# Patient Record
Sex: FEMALE | Race: WHITE | Employment: OTHER | URBAN - METROPOLITAN AREA
[De-identification: names, ages, dates, MRNs, and addresses within clinical notes are randomized per-mention and may not be internally consistent; named-entity substitution may affect disease eponyms.]

---

## 2017-10-06 ENCOUNTER — HOSPITAL ENCOUNTER (OUTPATIENT)
Dept: MAMMOGRAPHY | Age: 62
Discharge: HOME | End: 2017-10-06
Attending: NURSE PRACTITIONER
Payer: MEDICARE

## 2017-10-06 DIAGNOSIS — Z12.31 VISIT FOR SCREENING MAMMOGRAM: ICD-10-CM

## 2017-10-06 PROCEDURE — 77067 SCR MAMMO BI INCL CAD: CPT

## 2017-10-20 ENCOUNTER — HOSPITAL ENCOUNTER (OUTPATIENT)
Dept: MAMMOGRAPHY | Age: 62
Discharge: HOME | End: 2017-10-20
Attending: NURSE PRACTITIONER
Payer: MEDICARE

## 2017-10-20 ENCOUNTER — HOSPITAL ENCOUNTER (OUTPATIENT)
Dept: ULTRASOUND IMAGING | Age: 62
Discharge: HOME | End: 2017-10-20
Payer: MEDICARE

## 2017-10-20 DIAGNOSIS — R92.8 ABNORMAL MAMMOGRAM OF RIGHT BREAST: ICD-10-CM

## 2017-10-20 PROCEDURE — 76642 ULTRASOUND BREAST LIMITED: CPT

## 2017-10-20 PROCEDURE — 77065 DX MAMMO INCL CAD UNI: CPT

## 2017-10-25 ENCOUNTER — HOSPITAL ENCOUNTER (OUTPATIENT)
Dept: CT IMAGING | Age: 62
Discharge: HOME | End: 2017-10-25
Attending: UROLOGY
Payer: MEDICARE

## 2017-10-25 DIAGNOSIS — R31.29 MICROHEMATURIA: ICD-10-CM

## 2017-10-25 PROCEDURE — 74178 CT ABD&PLV WO CNTR FLWD CNTR: CPT

## 2017-10-25 PROCEDURE — 74011636320 HC RX REV CODE- 636/320: Performed by: UROLOGY

## 2017-10-25 PROCEDURE — 74011250636 HC RX REV CODE- 250/636: Performed by: UROLOGY

## 2017-10-25 RX ORDER — SODIUM CHLORIDE 9 MG/ML
50 INJECTION, SOLUTION INTRAVENOUS
Status: COMPLETED | OUTPATIENT
Start: 2017-10-25 | End: 2017-10-25

## 2017-10-25 RX ORDER — SODIUM CHLORIDE 0.9 % (FLUSH) 0.9 %
10 SYRINGE (ML) INJECTION
Status: COMPLETED | OUTPATIENT
Start: 2017-10-25 | End: 2017-10-25

## 2017-10-25 RX ADMIN — Medication 10 ML: at 06:57

## 2017-10-25 RX ADMIN — SODIUM CHLORIDE 50 ML/HR: 900 INJECTION, SOLUTION INTRAVENOUS at 06:57

## 2017-10-25 RX ADMIN — IOPAMIDOL 100 ML: 612 INJECTION, SOLUTION INTRAVENOUS at 06:57

## 2017-12-22 RX ORDER — LISINOPRIL 5 MG/1
5 TABLET ORAL
COMMUNITY

## 2017-12-22 RX ORDER — FOLIC ACID 1 MG/1
1 TABLET ORAL DAILY
COMMUNITY
End: 2018-02-09

## 2017-12-22 RX ORDER — GABAPENTIN 300 MG/1
900 CAPSULE ORAL
Status: ON HOLD | COMMUNITY
End: 2022-10-25

## 2017-12-22 RX ORDER — ACETAMINOPHEN 500 MG
500 TABLET ORAL 3 TIMES DAILY
COMMUNITY

## 2017-12-22 RX ORDER — TRAMADOL HYDROCHLORIDE 50 MG/1
50 TABLET ORAL 3 TIMES DAILY
COMMUNITY
End: 2019-01-20

## 2017-12-22 RX ORDER — GABAPENTIN 300 MG/1
300 CAPSULE ORAL DAILY
COMMUNITY

## 2017-12-22 RX ORDER — LANOLIN ALCOHOL/MO/W.PET/CERES
1000 CREAM (GRAM) TOPICAL DAILY
COMMUNITY

## 2017-12-22 RX ORDER — METHOTREXATE 2.5 MG/1
TABLET ORAL
COMMUNITY
End: 2018-02-09

## 2017-12-22 RX ORDER — OMEPRAZOLE 20 MG/1
40 CAPSULE, DELAYED RELEASE ORAL DAILY
COMMUNITY

## 2017-12-22 RX ORDER — GLUCOSAMINE SULFATE 1500 MG
2000 POWDER IN PACKET (EA) ORAL DAILY
COMMUNITY

## 2017-12-25 ENCOUNTER — ANESTHESIA EVENT (OUTPATIENT)
Dept: ENDOSCOPY | Age: 62
End: 2017-12-25
Payer: MEDICARE

## 2017-12-26 ENCOUNTER — ANESTHESIA (OUTPATIENT)
Dept: ENDOSCOPY | Age: 62
End: 2017-12-26
Payer: MEDICARE

## 2017-12-26 ENCOUNTER — HOSPITAL ENCOUNTER (OUTPATIENT)
Age: 62
Setting detail: OUTPATIENT SURGERY
Discharge: HOME | End: 2017-12-26
Attending: SPECIALIST | Admitting: SPECIALIST
Payer: MEDICARE

## 2017-12-26 VITALS
TEMPERATURE: 98.4 F | OXYGEN SATURATION: 98 % | WEIGHT: 221.6 LBS | SYSTOLIC BLOOD PRESSURE: 137 MMHG | RESPIRATION RATE: 18 BRPM | HEART RATE: 72 BPM | HEIGHT: 67 IN | BODY MASS INDEX: 34.78 KG/M2 | DIASTOLIC BLOOD PRESSURE: 38 MMHG

## 2017-12-26 PROCEDURE — 74011250636 HC RX REV CODE- 250/636

## 2017-12-26 PROCEDURE — 88305 TISSUE EXAM BY PATHOLOGIST: CPT | Performed by: SPECIALIST

## 2017-12-26 PROCEDURE — 77030027957 HC TBNG IRR ENDOGTR BUSS -B: Performed by: SPECIALIST

## 2017-12-26 PROCEDURE — 76040000019: Performed by: SPECIALIST

## 2017-12-26 PROCEDURE — 77030013992 HC SNR POLYP ENDOSC BSC -B: Performed by: SPECIALIST

## 2017-12-26 PROCEDURE — 77030011640 HC PAD GRND REM COVD -A: Performed by: SPECIALIST

## 2017-12-26 PROCEDURE — 76060000031 HC ANESTHESIA FIRST 0.5 HR: Performed by: SPECIALIST

## 2017-12-26 PROCEDURE — 74011000250 HC RX REV CODE- 250

## 2017-12-26 RX ORDER — SODIUM CHLORIDE 0.9 % (FLUSH) 0.9 %
5-10 SYRINGE (ML) INJECTION EVERY 8 HOURS
Status: DISCONTINUED | OUTPATIENT
Start: 2017-12-26 | End: 2017-12-26 | Stop reason: HOSPADM

## 2017-12-26 RX ORDER — EPINEPHRINE 0.1 MG/ML
1 INJECTION INTRACARDIAC; INTRAVENOUS
Status: DISCONTINUED | OUTPATIENT
Start: 2017-12-26 | End: 2017-12-26 | Stop reason: HOSPADM

## 2017-12-26 RX ORDER — PROPOFOL 10 MG/ML
INJECTION, EMULSION INTRAVENOUS AS NEEDED
Status: DISCONTINUED | OUTPATIENT
Start: 2017-12-26 | End: 2017-12-26 | Stop reason: HOSPADM

## 2017-12-26 RX ORDER — SODIUM CHLORIDE 0.9 % (FLUSH) 0.9 %
5-10 SYRINGE (ML) INJECTION AS NEEDED
Status: DISCONTINUED | OUTPATIENT
Start: 2017-12-26 | End: 2017-12-26 | Stop reason: HOSPADM

## 2017-12-26 RX ORDER — LIDOCAINE HYDROCHLORIDE 20 MG/ML
INJECTION, SOLUTION EPIDURAL; INFILTRATION; INTRACAUDAL; PERINEURAL AS NEEDED
Status: DISCONTINUED | OUTPATIENT
Start: 2017-12-26 | End: 2017-12-26 | Stop reason: HOSPADM

## 2017-12-26 RX ORDER — ATROPINE SULFATE 0.1 MG/ML
0.5 INJECTION INTRAVENOUS
Status: DISCONTINUED | OUTPATIENT
Start: 2017-12-26 | End: 2017-12-26 | Stop reason: HOSPADM

## 2017-12-26 RX ORDER — SODIUM CHLORIDE 9 MG/ML
INJECTION, SOLUTION INTRAVENOUS
Status: DISCONTINUED | OUTPATIENT
Start: 2017-12-26 | End: 2017-12-26 | Stop reason: HOSPADM

## 2017-12-26 RX ORDER — FENTANYL CITRATE 50 UG/ML
200 INJECTION, SOLUTION INTRAMUSCULAR; INTRAVENOUS
Status: DISCONTINUED | OUTPATIENT
Start: 2017-12-26 | End: 2017-12-26 | Stop reason: HOSPADM

## 2017-12-26 RX ORDER — DEXTROMETHORPHAN/PSEUDOEPHED 2.5-7.5/.8
1.2 DROPS ORAL
Status: DISCONTINUED | OUTPATIENT
Start: 2017-12-26 | End: 2017-12-26 | Stop reason: HOSPADM

## 2017-12-26 RX ORDER — MIDAZOLAM HYDROCHLORIDE 1 MG/ML
.25-1 INJECTION, SOLUTION INTRAMUSCULAR; INTRAVENOUS
Status: DISCONTINUED | OUTPATIENT
Start: 2017-12-26 | End: 2017-12-26 | Stop reason: HOSPADM

## 2017-12-26 RX ORDER — NALOXONE HYDROCHLORIDE 0.4 MG/ML
0.4 INJECTION, SOLUTION INTRAMUSCULAR; INTRAVENOUS; SUBCUTANEOUS
Status: DISCONTINUED | OUTPATIENT
Start: 2017-12-26 | End: 2017-12-26 | Stop reason: HOSPADM

## 2017-12-26 RX ORDER — FLUMAZENIL 0.1 MG/ML
0.2 INJECTION INTRAVENOUS
Status: DISCONTINUED | OUTPATIENT
Start: 2017-12-26 | End: 2017-12-26 | Stop reason: HOSPADM

## 2017-12-26 RX ORDER — SODIUM CHLORIDE 9 MG/ML
50 INJECTION, SOLUTION INTRAVENOUS CONTINUOUS
Status: DISCONTINUED | OUTPATIENT
Start: 2017-12-26 | End: 2017-12-26 | Stop reason: HOSPADM

## 2017-12-26 RX ADMIN — PROPOFOL 40 MG: 10 INJECTION, EMULSION INTRAVENOUS at 08:45

## 2017-12-26 RX ADMIN — PROPOFOL 40 MG: 10 INJECTION, EMULSION INTRAVENOUS at 08:58

## 2017-12-26 RX ADMIN — SODIUM CHLORIDE: 9 INJECTION, SOLUTION INTRAVENOUS at 08:32

## 2017-12-26 RX ADMIN — PROPOFOL 40 MG: 10 INJECTION, EMULSION INTRAVENOUS at 08:47

## 2017-12-26 RX ADMIN — PROPOFOL 40 MG: 10 INJECTION, EMULSION INTRAVENOUS at 09:02

## 2017-12-26 RX ADMIN — LIDOCAINE HYDROCHLORIDE 20 MG: 20 INJECTION, SOLUTION EPIDURAL; INFILTRATION; INTRACAUDAL; PERINEURAL at 08:45

## 2017-12-26 RX ADMIN — PROPOFOL 40 MG: 10 INJECTION, EMULSION INTRAVENOUS at 08:51

## 2017-12-26 RX ADMIN — PROPOFOL 40 MG: 10 INJECTION, EMULSION INTRAVENOUS at 09:04

## 2017-12-26 RX ADMIN — PROPOFOL 40 MG: 10 INJECTION, EMULSION INTRAVENOUS at 08:49

## 2017-12-26 RX ADMIN — PROPOFOL 30 MG: 10 INJECTION, EMULSION INTRAVENOUS at 09:06

## 2017-12-26 RX ADMIN — PROPOFOL 40 MG: 10 INJECTION, EMULSION INTRAVENOUS at 08:55

## 2017-12-26 RX ADMIN — PROPOFOL 40 MG: 10 INJECTION, EMULSION INTRAVENOUS at 09:00

## 2017-12-26 NOTE — IP AVS SNAPSHOT
[address] 
[phone] Patient: Edd Cotto MRN: [id number] About your hospitalization You were admitted on:  December 26, 2017 You last received care in the:  [address] [place] [address] You were discharged on:  December 26, 2017 Why you were hospitalized Your primary diagnosis was:  Not on File Discharge Orders None A check bethanie indicates which time of day the medication should be taken. My Medications TAKE these medications as instructed Instructions Each Dose to Equal  
 Morning Noon Evening Bedtime  
 acetaminophen 500 mg tablet Commonly known as:  TYLENOL Your last dose was: Your next dose is: Take 500 mg by mouth three (3) times daily. 500 mg  
    
   
   
   
  
 escitalopram oxalate 20 mg tablet Commonly known as:  Lius Clement Your last dose was: Your next dose is: Take 20 mg by mouth daily. 20 mg  
    
   
   
   
  
 folic acid 1 mg tablet Commonly known as:  Google Your last dose was: Your next dose is: Take 1 mg by mouth daily. 1 mg  
    
   
   
   
  
 * gabapentin 300 mg capsule Commonly known as:  NEURONTIN Your last dose was: Your next dose is: Take 300 mg by mouth daily. 300 mg  
    
   
   
   
  
 * gabapentin 300 mg capsule Commonly known as:  NEURONTIN Your last dose was: Your next dose is: Take 600 mg by mouth nightly. 600 mg  
    
   
   
   
  
 lisinopril 5 mg tablet Commonly known as:  Telma Temple Your last dose was: Your next dose is: Take 5 mg by mouth daily. 5 mg  
    
   
   
   
  
 methotrexate 2.5 mg tablet Commonly known as:  Kay Dhillon Your last dose was: Your next dose is: Take  by mouth. Takes 6 tablets po every Sunday. omeprazole 20 mg capsule Commonly known as:  PRILOSEC Your last dose was: Your next dose is: Take 40 mg by mouth daily. 40 mg  
    
   
   
   
  
 traMADol 50 mg tablet Commonly known as:  ULTRAM  
   
Your last dose was: Your next dose is: Take 50 mg by mouth three (3) times daily. 50 mg  
    
   
   
   
  
 VITAMIN B-12 1,000 mcg tablet Generic drug:  cyanocobalamin Your last dose was: Your next dose is: Take 1,000 mcg by mouth daily. 1000 mcg VITAMIN D3 1,000 unit Cap Generic drug:  cholecalciferol Your last dose was: Your next dose is: Take 2,000 Units by mouth daily. 2000 Units * Notice: This list has 2 medication(s) that are the same as other medications prescribed for you. Read the directions carefully, and ask your doctor or other care provider to review them with you. Discharge Instructions Roxy Ly [id number] COLON DISCHARGE INSTRUCTIONS Discomfort: 
Redness at IV site- apply warm compress to area; if redness or soreness persist- contact your physician There may be a slight amount of blood passed from the rectum Gaseous discomfort- walking, belching will help relieve any discomfort You may not operate a vehicle for 12 hours You may not engage in an occupation involving machinery or appliances for rest of today You may not drink alcoholic beverages for at least 12 hours Avoid making any critical decisions for at least 24 hour DIET: 
 High fiber diet.  however -  remember your colon is empty and a heavy meal will produce gas. Avoid these foods:  vegetables, fried / greasy foods, carbonated drinks for today. MEDICATIONS: 
  
 Regarding Aspirin or Nonsteroidal medications, please see below.  
 
ACTIVITY: 
 You may resume your normal daily activities it is recommended that you spend the remainder of the day resting -  avoid any strenuous activity. CALL M.D. ANY SIGN OF: Increasing pain, nausea, vomiting Abdominal distension (swelling) New increased bleeding (oral or rectal) Fever (chills) Pain in chest area Bloody discharge from nose or mouth Shortness of breath You may not  take any Advil, Aspirin, Ibuprofen, Motrin, Aleve, or Goodys for 10 days, ONLY  Tylenol as needed for pain. Follow-up Instructions: 
 Call Dr. Madeline Gomez Results of procedure / biopsy in 10 days Telephone #  [phone] DISCHARGE SUMMARY from Nurse The following personal items collected during your admission are returned to you:  
Dental Appliance: Dental Appliances: None Vision: Visual Aid: None Hearing Aid:   
Jewelry:   
Clothing:   
Other Valuables:   
Valuables sent to safe:   
 
 
 
 
  
  
  
Introducing [facility]! Dear Dada Hui: Thank you for requesting a Att[employer] account. Our records indicate that you already have an active Att[employer] Live account. You can access your account anytime at [email or web address] Did you know that you can access your hospital and ER discharge instructions at any time in Att[employer] Live? You can also review all of your test results from your hospital stay or ER visit. Additional Information If you have questions, please visit the Frequently Asked Questions section of the Att[employer] Live website at [email or web address] Remember, Att[employer] Live is NOT to be used for urgent needs. For medical emergencies, dial 911. Now available from your iPhone and Android! Providers Seen During Your Hospitalization Provider Specialty Primary office phone Fernando Benitez MD Gastroenterology [phone] Your Primary Care Physician (PCP) Primary Care Physician Office Phone Office Fax Bryan Escobar [phone] You are allergic to the following Allergen Reactions Morphine Nausea and Vomiting  
    
 Sulfa (Sulfonamide Antibiotics) Hives Recent Documentation Height Weight Breastfeeding? BMI OB Status Smoking Status 1.702 m 100.5 kg No 34.71 kg/m2 Hysterectomy Current Every Day Smoker Emergency Contacts Name Discharge Info Relation Home Work Mobile Natacha Alvarez DISCHARGE CAREGIVER [3] Spouse [3] [phone] Patient Belongings The following personal items are in your possession at time of discharge: 
  Dental Appliances: None  Visual Aid: None Please provide this summary of care documentation to your next provider. Signatures-by signing, you are acknowledging that this After Visit Summary has been reviewed with you and you have received a copy. Patient Signature:  ____________________________________________________________ Date:  ____________________________________________________________  
  
Vanesa Sleight Provider Signature:  ____________________________________________________________ Date:  ____________________________________________________________

## 2017-12-26 NOTE — DISCHARGE INSTRUCTIONS
Lee Ann Last  [id number]  1955    COLON DISCHARGE INSTRUCTIONS  Discomfort:  Redness at IV site- apply warm compress to area; if redness or soreness persist- contact your physician  There may be a slight amount of blood passed from the rectum  Gaseous discomfort- walking, belching will help relieve any discomfort  You may not operate a vehicle for 12 hours  You may not engage in an occupation involving machinery or appliances for rest of today  You may not drink alcoholic beverages for at least 12 hours  Avoid making any critical decisions for at least 24 hour  DIET:   High fiber diet. - however -  remember your colon is empty and a heavy meal will produce gas. Avoid these foods:  vegetables, fried / greasy foods, carbonated drinks for today. MEDICATIONS:      Regarding Aspirin or Nonsteroidal medications, please see below. ACTIVITY:  You may resume your normal daily activities it is recommended that you spend the remainder of the day resting -  avoid any strenuous activity. CALL M.D. ANY SIGN OF:  Increasing pain, nausea, vomiting  Abdominal distension (swelling)  New increased bleeding (oral or rectal)  Fever (chills)  Pain in chest area  Bloody discharge from nose or mouth  Shortness of breath    You may not  take any Advil, Aspirin, Ibuprofen, Motrin, Aleve, or Goodys for 10 days, ONLY  Tylenol as needed for pain.       Follow-up Instructions:   Call Dr. Echo Coy  Results of procedure / biopsy in 10 days  Telephone #  [phone]      DISCHARGE SUMMARY from Nurse    The following personal items collected during your admission are returned to you:   Dental Appliance: Dental Appliances: None  Vision: Visual Aid: None  Hearing Aid:    Jewelry:    Clothing:    Other Valuables:    Valuables sent to safe:

## 2017-12-26 NOTE — PROGRESS NOTES

## 2017-12-26 NOTE — ANESTHESIA PREPROCEDURE EVALUATION
Anesthetic History   No history of anesthetic complications            Review of Systems / Medical History  Patient summary reviewed, nursing notes reviewed and pertinent labs reviewed    Pulmonary  Within defined limits                 Neuro/Psych   Within defined limits           Cardiovascular    Hypertension              Exercise tolerance: >4 METS     GI/Hepatic/Renal     GERD           Endo/Other        Obesity     Other Findings              Physical Exam    Airway  Mallampati: II  TM Distance: 4 - 6 cm  Neck ROM: normal range of motion   Mouth opening: Normal     Cardiovascular  Regular rate and rhythm,  S1 and S2 normal,  no murmur, click, rub, or gallop             Dental  No notable dental hx       Pulmonary  Breath sounds clear to auscultation               Abdominal  GI exam deferred       Other Findings            Anesthetic Plan    ASA: 2  Anesthesia type: MAC          Induction: Intravenous  Anesthetic plan and risks discussed with: Patient

## 2017-12-26 NOTE — H&P
Colonoscopy History and Physical      The patient was seen and examined. Date of last colonoscopy: 2010, Polyps  Yes      The airway was assessed and documented. The problem list, past medical history, and medications were reviewed. Patient Active Problem List   Diagnosis Code    Spinal stenosis M48.00     Social History     Social History    Marital status:      Spouse name: N/A    Number of children: N/A    Years of education: N/A     Occupational History    Not on file. Social History Main Topics    Smoking status: Current Every Day Smoker     Packs/day: 0.50    Smokeless tobacco: Never Used    Alcohol use Yes      Comment: rare    Drug use: No    Sexual activity: Not on file     Other Topics Concern    Not on file     Social History Narrative     Past Medical History:   Diagnosis Date    Adverse effect of anesthesia     passed out when \"she woke me up\"    Anxiety     Arthritis     Calculus of kidney     Chronic pain     arthritis/bursitis both hips/back    Depression     GERD (gastroesophageal reflux disease)     Hypertension     Other ill-defined conditions(799.89)     kidney stones         Prior to Admission Medications   Prescriptions Last Dose Informant Patient Reported? Taking?   acetaminophen (TYLENOL) 500 mg tablet 12/25/2017 at Unknown time  Yes Yes   Sig: Take 500 mg by mouth three (3) times daily. cholecalciferol (VITAMIN D3) 1,000 unit cap 12/25/2017 at Unknown time  Yes Yes   Sig: Take 2,000 Units by mouth daily. cyanocobalamin (VITAMIN B-12) 1,000 mcg tablet 12/25/2017 at Unknown time  Yes Yes   Sig: Take 1,000 mcg by mouth daily. escitalopram oxalate (LEXAPRO) 20 mg tablet 12/25/2017 at Unknown time  Yes Yes   Sig: Take 20 mg by mouth daily. folic acid (FOLVITE) 1 mg tablet 12/25/2017 at Unknown time  Yes Yes   Sig: Take 1 mg by mouth daily. gabapentin (NEURONTIN) 300 mg capsule 12/25/2017 at Unknown time  Yes Yes   Sig: Take 300 mg by mouth daily. gabapentin (NEURONTIN) 300 mg capsule 12/25/2017 at Unknown time  Yes Yes   Sig: Take 600 mg by mouth nightly. lisinopril (PRINIVIL, ZESTRIL) 5 mg tablet 12/25/2017 at Unknown time  Yes Yes   Sig: Take 5 mg by mouth daily. methotrexate (RHEUMATREX) 2.5 mg tablet 12/25/2017 at Unknown time  Yes Yes   Sig: Take  by mouth. Takes 6 tablets po every Sunday. omeprazole (PRILOSEC) 20 mg capsule 12/25/2017 at Unknown time  Yes Yes   Sig: Take 40 mg by mouth daily. traMADol (ULTRAM) 50 mg tablet 12/25/2017 at Unknown time  Yes Yes   Sig: Take 50 mg by mouth three (3) times daily. Facility-Administered Medications: None       The patient was seen and examined in the endoscopy suite. The airway was assessed and docuemented. The problem list and medications were reviewed. Chief complaint, history of present illness, and review of systems and Past medical History are positive for: depression, GERD and colon polyps    The heart, lungs and mental status were satisfactory for the administration of sedation and for the procedure. I discussed with the patient the objectives, risks, consequences and alternatives to the procedure.      Plan: Endoscopic procedure with sedation     Mariana Coates MD   12/26/2017  8:47 AM

## 2017-12-26 NOTE — ROUTINE PROCESS
Dmitriy Milton  1955  [id number]    Situation:  Verbal report received from: Prerna Clinton RN  Procedure: Procedure(s):  COLONOSCOPY  ENDOSCOPIC POLYPECTOMY    Background:    Preoperative diagnosis: History of polyps  Postoperative diagnosis: 1.- Colon Polyps    :  Dr. Lizzie Lucio  Assistant(s): Endoscopy Technician-1: Martha Gonzalez  Endoscopy RN-1: Mio Chun RN    Specimens:   ID Type Source Tests Collected by Time Destination   1 : Transverse Colon Polyps Preservative   Cheikh Orozco MD 12/26/2017 3389 Pathology     H. Pylori  no    Assessment:  Intra-procedure medications   Anesthesia gave intra-procedure sedation and medications, see anesthesia flow sheet yes    Intravenous fluids: NS@ KVO     Vital signs stable     Abdominal assessment: round and soft     Recommendation:  Discharge patient per MD order.   Family or Friend   Permission to share finding with family or friend yes

## 2017-12-26 NOTE — PROCEDURES
[facility]  [address]                 Colonoscopy Procedure Note    Indications:   See Preoperative Diagnosis above  Referring Physician: Breanna Rizvi NP  Anesthesia/Sedation: MAC anesthesia Propofol  Endoscopist:  Dr. Gary Bauman  Assistant:  Endoscopy Technician-1: Muna James  Endoscopy RN-1: Ab Padilla RN  Preoperative diagnosis: History of polyps  Postoperative diagnosis: 1.- Colon Polyps    Procedure in Detail:  Informed consent was obtained for the procedure, including sedation. Risks of perforation, hemorrhage, adverse drug reaction, and aspiration were discussed. The patient was placed in the left lateral decubitus position. Based on the pre-procedure assessment, including review of the patient's medical history, medications, allergies, and review of systems, she had been deemed to be an appropriate candidate for moderate sedation; she was therefore sedated with the medications listed above. The patient was monitored continuously with ECG tracing, pulse oximetry, blood pressure monitoring, and direct observations. A rectal examination was performed. The [id number] was inserted into the rectum and advanced under direct vision to the cecum, which was identified by the ileocecal valve and appendiceal orifice. The quality of the colonic preparation was good. A careful inspection was made as the colonoscope was withdrawn, including a retroflexed view of the rectum; findings and interventions are described below. Appropriate photodocumentation was obtained. Findings:  Rectum: normal  Sigmoid: normal  Descending Colon: normal  Transverse Colon: Two polyps measuring 5, 6 mm were removed with colon   Ascending Colon: normal  Cecum: normal      Specimens:    colon polyp    EBL: None    Complications: None; patient tolerated the procedure well. Recommendations:     - Await pathology. - Repeat colonoscopy in 5 years.     Signed By: Cidny Lopez MD                        December 26, 2017

## 2017-12-26 NOTE — ANESTHESIA POSTPROCEDURE EVALUATION
Post-Anesthesia Evaluation and Assessment    Patient: Brenda Vazquez MRN: [id number]  SSN: [id number]    YOB: 1955  Age: 65 y.o. Sex: female       Cardiovascular Function/Vital Signs  Visit Vitals    BP (!) 137/38    Pulse 72    Temp 36.9 °C (98.4 °F)    Resp 18    Ht 5' 7\" (1.702 m)    Wt 100.5 kg (221 lb 9.6 oz)    SpO2 98%    Breastfeeding No    BMI 34.71 kg/m2       Patient is status post MAC anesthesia for Procedure(s):  COLONOSCOPY  ENDOSCOPIC POLYPECTOMY. Nausea/Vomiting: None    Postoperative hydration reviewed and adequate. Pain:  Pain Scale 1: Numeric (0 - 10) (12/26/17 0940)  Pain Intensity 1: 0 (12/26/17 0940)   Managed    Neurological Status: At baseline    Mental Status and Level of Consciousness: Arousable    Pulmonary Status:   O2 Device: Room air (12/26/17 0940)   Adequate oxygenation and airway patent    Complications related to anesthesia: None    Post-anesthesia assessment completed.  No concerns    Signed By: Valentin Altman DO     December 26, 2017

## 2018-02-09 ENCOUNTER — APPOINTMENT (OUTPATIENT)
Dept: CT IMAGING | Age: 63
End: 2018-02-09
Attending: EMERGENCY MEDICINE
Payer: MEDICARE

## 2018-02-09 ENCOUNTER — HOSPITAL ENCOUNTER (EMERGENCY)
Age: 63
Discharge: HOME | End: 2018-02-09
Attending: EMERGENCY MEDICINE
Payer: MEDICARE

## 2018-02-09 VITALS
RESPIRATION RATE: 18 BRPM | HEIGHT: 67 IN | HEART RATE: 70 BPM | DIASTOLIC BLOOD PRESSURE: 64 MMHG | BODY MASS INDEX: 34.95 KG/M2 | SYSTOLIC BLOOD PRESSURE: 137 MMHG | TEMPERATURE: 97.7 F | WEIGHT: 222.66 LBS | OXYGEN SATURATION: 97 %

## 2018-02-09 DIAGNOSIS — R10.9 RIGHT SIDED ABDOMINAL PAIN: Primary | ICD-10-CM

## 2018-02-09 LAB
ALBUMIN SERPL-MCNC: 4 G/DL (ref 3.5–5)
ALBUMIN/GLOB SERPL: 1 {RATIO} (ref 1.1–2.2)
ALP SERPL-CCNC: 70 U/L (ref 45–117)
ALT SERPL-CCNC: 28 U/L (ref 12–78)
ANION GAP SERPL CALC-SCNC: 4 MMOL/L (ref 5–15)
APPEARANCE UR: ABNORMAL
AST SERPL-CCNC: 12 U/L (ref 15–37)
BILIRUB SERPL-MCNC: 0.3 MG/DL (ref 0.2–1)
BILIRUB UR QL CFM: NEGATIVE
BUN SERPL-MCNC: 11 MG/DL (ref 6–20)
BUN/CREAT SERPL: 12 (ref 12–20)
CALCIUM SERPL-MCNC: 9.1 MG/DL (ref 8.5–10.1)
CHLORIDE SERPL-SCNC: 103 MMOL/L (ref 97–108)
CO2 SERPL-SCNC: 30 MMOL/L (ref 21–32)
COLOR UR: ABNORMAL
CREAT SERPL-MCNC: 0.91 MG/DL (ref 0.55–1.02)
ERYTHROCYTE [DISTWIDTH] IN BLOOD BY AUTOMATED COUNT: 12.8 % (ref 11.5–14.5)
GLOBULIN SER CALC-MCNC: 3.9 G/DL (ref 2–4)
GLUCOSE SERPL-MCNC: 81 MG/DL (ref 65–100)
GLUCOSE UR STRIP.AUTO-MCNC: NEGATIVE MG/DL
HCG UR QL: NEGATIVE
HCT VFR BLD AUTO: 44.4 % (ref 35–47)
HGB BLD-MCNC: 14.9 G/DL (ref 11.5–16)
HGB UR QL STRIP: NEGATIVE
KETONES UR QL STRIP.AUTO: NEGATIVE MG/DL
LEUKOCYTE ESTERASE UR QL STRIP.AUTO: NEGATIVE
MCH RBC QN AUTO: 33.8 PG (ref 26–34)
MCHC RBC AUTO-ENTMCNC: 33.6 G/DL (ref 30–36.5)
MCV RBC AUTO: 100.7 FL (ref 80–99)
NITRITE UR QL STRIP.AUTO: NEGATIVE
NRBC # BLD: 0 K/UL (ref 0–0.01)
NRBC BLD-RTO: 0 PER 100 WBC
PH UR STRIP: 6 [PH] (ref 5–8)
PLATELET # BLD AUTO: 255 K/UL (ref 150–400)
PMV BLD AUTO: 9.4 FL (ref 8.9–12.9)
POTASSIUM SERPL-SCNC: 4.1 MMOL/L (ref 3.5–5.1)
PROT SERPL-MCNC: 7.9 G/DL (ref 6.4–8.2)
PROT UR STRIP-MCNC: NEGATIVE MG/DL
RBC # BLD AUTO: 4.41 M/UL (ref 3.8–5.2)
SODIUM SERPL-SCNC: 137 MMOL/L (ref 136–145)
SP GR UR REFRACTOMETRY: 1.03 (ref 1–1.03)
UROBILINOGEN UR QL STRIP.AUTO: 1 EU/DL (ref 0.2–1)
WBC # BLD AUTO: 11.7 K/UL (ref 3.6–11)

## 2018-02-09 PROCEDURE — 80053 COMPREHEN METABOLIC PANEL: CPT | Performed by: EMERGENCY MEDICINE

## 2018-02-09 PROCEDURE — 74177 CT ABD & PELVIS W/CONTRAST: CPT

## 2018-02-09 PROCEDURE — 74011636320 HC RX REV CODE- 636/320: Performed by: EMERGENCY MEDICINE

## 2018-02-09 PROCEDURE — 81003 URINALYSIS AUTO W/O SCOPE: CPT | Performed by: EMERGENCY MEDICINE

## 2018-02-09 PROCEDURE — 85027 COMPLETE CBC AUTOMATED: CPT | Performed by: EMERGENCY MEDICINE

## 2018-02-09 PROCEDURE — 36415 COLL VENOUS BLD VENIPUNCTURE: CPT | Performed by: EMERGENCY MEDICINE

## 2018-02-09 PROCEDURE — 81025 URINE PREGNANCY TEST: CPT | Performed by: EMERGENCY MEDICINE

## 2018-02-09 PROCEDURE — 74011250636 HC RX REV CODE- 250/636: Performed by: EMERGENCY MEDICINE

## 2018-02-09 PROCEDURE — 99283 EMERGENCY DEPT VISIT LOW MDM: CPT

## 2018-02-09 RX ORDER — DICYCLOMINE HYDROCHLORIDE 10 MG/1
10 CAPSULE ORAL 4 TIMES DAILY
Qty: 20 CAP | Refills: 0 | Status: SHIPPED | OUTPATIENT
Start: 2018-02-09 | End: 2018-02-14

## 2018-02-09 RX ORDER — SODIUM CHLORIDE 0.9 % (FLUSH) 0.9 %
10 SYRINGE (ML) INJECTION
Status: COMPLETED | OUTPATIENT
Start: 2018-02-09 | End: 2018-02-09

## 2018-02-09 RX ORDER — ONDANSETRON 4 MG/1
4 TABLET, FILM COATED ORAL
Qty: 20 TAB | Refills: 0 | Status: SHIPPED | OUTPATIENT
Start: 2018-02-09

## 2018-02-09 RX ORDER — SODIUM CHLORIDE 9 MG/ML
50 INJECTION, SOLUTION INTRAVENOUS
Status: COMPLETED | OUTPATIENT
Start: 2018-02-09 | End: 2018-02-09

## 2018-02-09 RX ADMIN — IOPAMIDOL 100 ML: 755 INJECTION, SOLUTION INTRAVENOUS at 14:31

## 2018-02-09 RX ADMIN — SODIUM CHLORIDE 50 ML/HR: 900 INJECTION, SOLUTION INTRAVENOUS at 14:31

## 2018-02-09 RX ADMIN — Medication 10 ML: at 14:31

## 2018-02-09 NOTE — DISCHARGE INSTRUCTIONS
Abdominal Pain: Care Instructions  Your Care Instructions    Abdominal pain has many possible causes. Some aren't serious and get better on their own in a few days. Others need more testing and treatment. If your pain continues or gets worse, you need to be rechecked and may need more tests to find out what is wrong. You may need surgery to correct the problem. Don't ignore new symptoms, such as fever, nausea and vomiting, urination problems, pain that gets worse, and dizziness. These may be signs of a more serious problem. Your doctor may have recommended a follow-up visit in the next 8 to 12 hours. If you are not getting better, you may need more tests or treatment. The doctor has checked you carefully, but problems can develop later. If you notice any problems or new symptoms, get medical treatment right away. Follow-up care is a key part of your treatment and safety. Be sure to make and go to all appointments, and call your doctor if you are having problems. It's also a good idea to know your test results and keep a list of the medicines you take. How can you care for yourself at home? · Rest until you feel better. · To prevent dehydration, drink plenty of fluids, enough so that your urine is light yellow or clear like water. Choose water and other caffeine-free clear liquids until you feel better. If you have kidney, heart, or liver disease and have to limit fluids, talk with your doctor before you increase the amount of fluids you drink. · If your stomach is upset, eat mild foods, such as rice, dry toast or crackers, bananas, and applesauce. Try eating several small meals instead of two or three large ones. · Wait until 48 hours after all symptoms have gone away before you have spicy foods, alcohol, and drinks that contain caffeine. · Do not eat foods that are high in fat. · Avoid anti-inflammatory medicines such as aspirin, ibuprofen (Advil, Motrin), and naproxen (Aleve).  These can cause stomach upset. Talk to your doctor if you take daily aspirin for another health problem. When should you call for help? Call 911 anytime you think you may need emergency care. For example, call if:  ? · You passed out (lost consciousness). ? · You pass maroon or very bloody stools. ? · You vomit blood or what looks like coffee grounds. ? · You have new, severe belly pain. ?Call your doctor now or seek immediate medical care if:  ? · Your pain gets worse, especially if it becomes focused in one area of your belly. ? · You have a new or higher fever. ? · Your stools are black and look like tar, or they have streaks of blood. ? · You have unexpected vaginal bleeding. ? · You have symptoms of a urinary tract infection. These may include:  ¨ Pain when you urinate. ¨ Urinating more often than usual.  ¨ Blood in your urine. ? · You are dizzy or lightheaded, or you feel like you may faint. ? Watch closely for changes in your health, and be sure to contact your doctor if:  ? · You are not getting better after 1 day (24 hours). Where can you learn more? Go to http://colin-hanna.info/. Enter W540 in the search box to learn more about \"Abdominal Pain: Care Instructions. \"  Current as of: March 20, 2017  Content Version: 11.4  © 8691-6973 [employer]. Care instructions adapted under license by [employer] (which disclaims liability or warranty for this information). If you have questions about a medical condition or this instruction, always ask your healthcare professional. [place] [address] any warranty or liability for your use of this information.

## 2018-02-09 NOTE — ED NOTES
Discharge instructions reviewed with patient, copy given by Dr. Jasper Poole. Pt is accomponied by family, denies use of wheelchair.

## 2018-02-10 NOTE — ED PROVIDER NOTES
EMERGENCY DEPARTMENT HISTORY AND PHYSICAL EXAM      Date: 2/9/2018  Patient Name: Joann Florez    History of Presenting Illness     Chief Complaint   Patient presents with    Abdominal Pain     right side for three days. History Provided By: Patient    HPI: Joann Florez, 65 y.o. female with PMHx significant for Arthritis, GERD, anxiety, depression, HTN, presents ambulatory to the ED with cc of acute onset, constant RLQ abdominal pain x 3-4 days with associated intermittent nausea. Pt was getting out of bed when her symptoms onset. Pt was recently seen by her PCP for her pain and received a \"pain shot\", she notes her current pain is a 5/10 after the shot. She also reports a recent colonoscopy, she states they found and treated some polyps but the colonoscopy was otherwise negative. Pt specifically denies any constipation, vomiting, dysuria, hematuria, urinary frequency, vaginal bleeding, vaginal discharge, fevers, excessive hunger. Social Hx: + Tobacco (0.5 ppd), + EtOH (rarely), - illicit drug use (-)    PCP: Kemi Lyon, NP    There are no other complaints, changes, or physical findings at this time. Current Outpatient Prescriptions   Medication Sig Dispense Refill    dicyclomine (BENTYL) 10 mg capsule Take 1 Cap by mouth four (4) times daily for 5 days. 20 Cap 0    ondansetron hcl (ZOFRAN, AS HYDROCHLORIDE,) 4 mg tablet Take 1 Tab by mouth every eight (8) hours as needed for Nausea. 20 Tab 0    cholecalciferol (VITAMIN D3) 1,000 unit cap Take 2,000 Units by mouth daily.  cyanocobalamin (VITAMIN B-12) 1,000 mcg tablet Take 1,000 mcg by mouth daily.  gabapentin (NEURONTIN) 300 mg capsule Take 300 mg by mouth daily.  gabapentin (NEURONTIN) 300 mg capsule Take 600 mg by mouth nightly.  traMADol (ULTRAM) 50 mg tablet Take 50 mg by mouth three (3) times daily.  lisinopril (PRINIVIL, ZESTRIL) 5 mg tablet Take 5 mg by mouth daily.       acetaminophen (TYLENOL) 500 mg tablet Take 500 mg by mouth three (3) times daily.  omeprazole (PRILOSEC) 20 mg capsule Take 40 mg by mouth daily.  escitalopram oxalate (LEXAPRO) 20 mg tablet Take 20 mg by mouth daily. Past History     Past Medical History:  Past Medical History:   Diagnosis Date    Adverse effect of anesthesia     passed out when \"she woke me up\"    Anxiety     Arthritis     Calculus of kidney     Chronic pain     arthritis/bursitis both hips/back    Depression     GERD (gastroesophageal reflux disease)     Hypertension     Other ill-defined conditions(799.89)     kidney stones       Past Surgical History:  Past Surgical History:   Procedure Laterality Date    CHEST SURGERY PROCEDURE UNLISTED      right lung lobectomy r/t fungal infection    COLONOSCOPY N/A 12/26/2017    COLONOSCOPY performed by Toshia Blake MD at [facility]    HX BREAST BIOPSY Left 2002    benign    HX CHOLECYSTECTOMY      HX GYN      partial hysterectomy - still have ovaries    HX ORTHOPAEDIC  4/28/12     L4-5 DECOMPRESSION AND FUSION with hardware    HX OTHER SURGICAL      fatty tumor removal (back)       Family History:  Family History   Problem Relation Age of Onset    Heart Disease Mother     Hypertension Brother     Diabetes Brother     Hypertension Brother     Diabetes Brother     Diabetes Brother     Cancer Brother      \"melanoma that grew inside\"    Cancer Maternal Aunt      lung    Cancer Maternal Uncle      ? where - 8 maternal uncles    Cancer Maternal Aunt      lung    Cancer Maternal Aunt      lung with mets       Social History:  Social History   Substance Use Topics    Smoking status: Current Every Day Smoker     Packs/day: 0.50    Smokeless tobacco: Never Used    Alcohol use Yes      Comment: rare       Allergies: Allergies   Allergen Reactions    Morphine Nausea and Vomiting    Sulfa (Sulfonamide Antibiotics) Hives         Review of Systems   Review of Systems   Constitutional: Negative. Negative for chills, diaphoresis and fever. HENT: Negative. Negative for congestion, sore throat and trouble swallowing. Eyes: Negative. Negative for photophobia, pain and redness. Respiratory: Negative. Negative for cough, chest tightness, shortness of breath and wheezing. Cardiovascular: Negative. Negative for chest pain and palpitations. Gastrointestinal: Positive for abdominal pain and nausea. Negative for blood in stool, diarrhea and vomiting. Genitourinary: Negative for difficulty urinating, dysuria and frequency. Musculoskeletal: Negative. Negative for arthralgias, myalgias, neck pain and neck stiffness. Skin: Negative. Neurological: Negative. Negative for dizziness, tremors, seizures, syncope, speech difficulty, light-headedness and headaches. Psychiatric/Behavioral: Negative. Negative for confusion. The patient is not nervous/anxious. All other systems reviewed and are negative. Physical Exam   Physical Exam   Constitutional: She is oriented to person, place, and time. She appears well-developed and well-nourished. HENT:   Head: Normocephalic and atraumatic. Eyes: Conjunctivae and EOM are normal.   Neck: Normal range of motion. Neck supple. Cardiovascular: Normal rate and regular rhythm. Pulmonary/Chest: Effort normal and breath sounds normal. No respiratory distress. Abdominal: Soft. She exhibits no distension. There is tenderness in the right lower quadrant.   + Rovsing's sign    Musculoskeletal: Normal range of motion. Neurological: She is alert and oriented to person, place, and time. Skin: Skin is warm and dry. Psychiatric: She has a normal mood and affect. Nursing note and vitals reviewed.         Diagnostic Study Results     Labs -     Recent Results (from the past 12 hour(s))   URINALYSIS W/ RFLX MICROSCOPIC    Collection Time: 02/09/18 12:56 PM   Result Value Ref Range    Color DARK YELLOW      Appearance CLOUDY (A) CLEAR      Specific gravity 1.028 1.003 - 1.030      pH (UA) 6.0 5.0 - 8.0      Protein NEGATIVE  NEG mg/dL    Glucose NEGATIVE  NEG mg/dL    Ketone NEGATIVE  NEG mg/dL    Blood NEGATIVE  NEG      Urobilinogen 1.0 0.2 - 1.0 EU/dL    Nitrites NEGATIVE  NEG      Leukocyte Esterase NEGATIVE  NEG     HCG URINE, QL    Collection Time: 02/09/18 12:56 PM   Result Value Ref Range    HCG urine, QL NEGATIVE  NEG     BILIRUBIN, CONFIRM    Collection Time: 02/09/18 12:56 PM   Result Value Ref Range    Bilirubin UA, confirm NEGATIVE  NEG     CBC W/O DIFF    Collection Time: 02/09/18  1:00 PM   Result Value Ref Range    WBC 11.7 (H) 3.6 - 11.0 K/uL    RBC 4.41 3.80 - 5.20 M/uL    HGB 14.9 11.5 - 16.0 g/dL    HCT 44.4 35.0 - 47.0 %    .7 (H) 80.0 - 99.0 FL    MCH 33.8 26.0 - 34.0 PG    MCHC 33.6 30.0 - 36.5 g/dL    RDW 12.8 11.5 - 14.5 %    PLATELET 468 940 - 655 K/uL    MPV 9.4 8.9 - 12.9 FL    NRBC 0.0 0  WBC    ABSOLUTE NRBC 0.00 0.00 - 1.31 K/uL   METABOLIC PANEL, COMPREHENSIVE    Collection Time: 02/09/18  1:00 PM   Result Value Ref Range    Sodium 137 136 - 145 mmol/L    Potassium 4.1 3.5 - 5.1 mmol/L    Chloride 103 97 - 108 mmol/L    CO2 30 21 - 32 mmol/L    Anion gap 4 (L) 5 - 15 mmol/L    Glucose 81 65 - 100 mg/dL    BUN 11 6 - 20 MG/DL    Creatinine 0.91 0.55 - 1.02 MG/DL    BUN/Creatinine ratio 12 12 - 20      GFR est AA >60 >60 ml/min/1.73m2    GFR est non-AA >60 >60 ml/min/1.73m2    Calcium 9.1 8.5 - 10.1 MG/DL    Bilirubin, total 0.3 0.2 - 1.0 MG/DL    ALT (SGPT) 28 12 - 78 U/L    AST (SGOT) 12 (L) 15 - 37 U/L    Alk. phosphatase 70 45 - 117 U/L    Protein, total 7.9 6.4 - 8.2 g/dL    Albumin 4.0 3.5 - 5.0 g/dL    Globulin 3.9 2.0 - 4.0 g/dL    A-G Ratio 1.0 (L) 1.1 - 2.2         Radiologic Studies -   CT ABD PELV W CONT   Final Result        CT Results  (Last 48 hours)               02/09/18 1430  CT ABD PELV W CONT Final result    Impression:  IMPRESSION:   Status post hysterectomy and cholecystectomy. No acute abnormality. Narrative:  EXAM:  CT ABD PELV W CONT       INDICATION: Right lower quadrant pain, fever, elevated white blood cell count   for 3 days       COMPARISON: 10/25/2017       CONTRAST:  100 mL of Isovue-370. TECHNIQUE:    Following the uneventful intravenous administration of contrast, thin axial   images were obtained through the abdomen and pelvis. Coronal and sagittal   reconstructions were generated. Oral contrast was not administered. CT dose   reduction was achieved through use of a standardized protocol tailored for this   examination and automatic exposure control for dose modulation. FINDINGS:    LUNG BASES: Clear. INCIDENTALLY IMAGED HEART AND MEDIASTINUM: Unremarkable. LIVER: No mass or biliary dilatation. GALLBLADDER: Surgically absent. SPLEEN: Splenic granulomata are noted. PANCREAS: No mass or ductal dilatation. ADRENALS: Unremarkable. KIDNEYS: Tiny simple and hemorrhagic cysts left kidney are stable. STOMACH: Unremarkable. SMALL BOWEL: No dilatation or wall thickening. COLON: No dilatation or wall thickening. APPENDIX: Unremarkable. PERITONEUM: No ascites or pneumoperitoneum. RETROPERITONEUM: No lymphadenopathy or aortic aneurysm. REPRODUCTIVE ORGANS: The uterus is surgically absent. URINARY BLADDER: No mass or calculus. BONES: Degenerative changes are seen in the hips bilaterally and lumbar spine. Postoperative changes are noted at L4-5. ADDITIONAL COMMENTS: N/A               CXR Results  (Last 48 hours)    None            Medical Decision Making   I am the first provider for this patient. I reviewed the vital signs, available nursing notes, past medical history, past surgical history, family history and social history. Vital Signs-Reviewed the patient's vital signs.   Patient Vitals for the past 12 hrs:   Temp Pulse Resp BP SpO2   02/09/18 1515 - 70 - 137/64 97 %   02/09/18 1415 - - - 157/70 95 %   02/09/18 1207 97.7 °F (36.5 °C) 66 18 (!) 129/95 98 %     Records Reviewed: Nursing Notes and Old Medical Records    Provider Notes (Medical Decision Making):   Patient presents with RLQ abdominal pain. DDx: kidney stone, Appendicitis, colitis, malrotation, ovarian pathology, hernia. Will obtain UA, UPT, labwork and analgesics and determine if CT abdomen warranted. ED Course:   Initial assessment performed. The patients presenting problems have been discussed, and they are in agreement with the care plan formulated and outlined with them. I have encouraged them to ask questions as they arise throughout their visit. Critical Care Time:   none    Disposition:  DISCHARGE NOTE  3:05 PM  The patient has been re-evaluated and is ready for discharge. Reviewed available results with patient. Counseled pt on diagnosis and care plan. Pt has expressed understanding, and all questions have been answered. Pt agrees with plan and agrees to F/U as recommended, or return to the ED if their sxs worsen. Discharge instructions have been provided and explained to the pt, along with reasons to return to the ED. PLAN:  1. Discharge Medication List as of 2/9/2018  3:05 PM      START taking these medications    Details   dicyclomine (BENTYL) 10 mg capsule Take 1 Cap by mouth four (4) times daily for 5 days. , Normal, Disp-20 Cap, R-0      ondansetron hcl (ZOFRAN, AS HYDROCHLORIDE,) 4 mg tablet Take 1 Tab by mouth every eight (8) hours as needed for Nausea., Normal, Disp-20 Tab, R-0         CONTINUE these medications which have NOT CHANGED    Details   cholecalciferol (VITAMIN D3) 1,000 unit cap Take 2,000 Units by mouth daily. , Historical Med      cyanocobalamin (VITAMIN B-12) 1,000 mcg tablet Take 1,000 mcg by mouth daily. , Historical Med      !! gabapentin (NEURONTIN) 300 mg capsule Take 300 mg by mouth daily. , Historical Med      !! gabapentin (NEURONTIN) 300 mg capsule Take 600 mg by mouth nightly., Historical Med      traMADol (ULTRAM) 50 mg tablet Take 50 mg by mouth three (3) times daily. , Historical Med      lisinopril (PRINIVIL, ZESTRIL) 5 mg tablet Take 5 mg by mouth daily. , Historical Med      acetaminophen (TYLENOL) 500 mg tablet Take 500 mg by mouth three (3) times daily. , Historical Med      omeprazole (PRILOSEC) 20 mg capsule Take 40 mg by mouth daily. , Historical Med      escitalopram oxalate (LEXAPRO) 20 mg tablet Take 20 mg by mouth daily. , Historical Med       !! - Potential duplicate medications found. Please discuss with provider. 2.   Follow-up Information     Follow up With Details Comments Contact Info    Vianca Victoria NP  If symptoms worsen [id number] [facility] [address]  [phone]          Return to ED if worse     Diagnosis     Clinical Impression:   1. Right sided abdominal pain        Attestations: This note is prepared by Mumtaz Lu acting as Scribe for MD Sha Rubin MD : The scribe's documentation has been prepared under my direction and personally reviewed by me in its entirety. I confirm that the note above accurately reflects all work, treatment, procedures, and medical decision making performed by me.

## 2018-04-15 ENCOUNTER — HOSPITAL ENCOUNTER (EMERGENCY)
Age: 63
Discharge: HOME | End: 2018-04-15
Attending: EMERGENCY MEDICINE
Payer: MEDICARE

## 2018-04-15 VITALS
SYSTOLIC BLOOD PRESSURE: 154 MMHG | HEART RATE: 87 BPM | DIASTOLIC BLOOD PRESSURE: 76 MMHG | HEIGHT: 67 IN | TEMPERATURE: 98.8 F | RESPIRATION RATE: 12 BRPM | BODY MASS INDEX: 34.95 KG/M2 | OXYGEN SATURATION: 94 % | WEIGHT: 222.66 LBS

## 2018-04-15 DIAGNOSIS — L02.91 ABSCESS: Primary | ICD-10-CM

## 2018-04-15 PROCEDURE — 75810000115

## 2018-04-15 PROCEDURE — 99282 EMERGENCY DEPT VISIT SF MDM: CPT

## 2018-04-15 PROCEDURE — 77030019895 HC PCKNG STRP IODO -A

## 2018-04-15 PROCEDURE — 77030018846 HC SOL IRR STRL H20 ICUM -A

## 2018-04-15 RX ORDER — DOXYCYCLINE 100 MG/1
100 TABLET ORAL 2 TIMES DAILY
Qty: 14 TAB | Refills: 0 | Status: SHIPPED | OUTPATIENT
Start: 2018-04-15 | End: 2018-04-22

## 2018-04-15 RX ORDER — LIDOCAINE HYDROCHLORIDE AND EPINEPHRINE 20; 5 MG/ML; UG/ML
INJECTION, SOLUTION EPIDURAL; INFILTRATION; INTRACAUDAL; PERINEURAL
Status: DISCONTINUED
Start: 2018-04-15 | End: 2018-04-15 | Stop reason: HOSPADM

## 2018-04-15 NOTE — ED PROVIDER NOTES
EMERGENCY DEPARTMENT HISTORY AND PHYSICAL EXAM      Date: 4/15/2018  Patient Name: Rainell Litten    History of Presenting Illness     Chief Complaint   Patient presents with    Skin Problem     Ambulatory into the ED with c/o \"cyst\" to Lt and Rt groin x 3 days. History Provided By: Patient    HPI: Rainell Litten, 65 y.o. female with PMHx significant for arthritis, anxiety, GERD, HTN, presents ambulatory to the ED with cc of moderately painful  L-sided perirectal abscess present x 3 days. Pt reports associated sx of nausea. Pt reports a hx of pelvic/rectal abscesses in the past, but states that they typically resolve on their own by draining at home. She denies any drainage from the area over the past few days. She did take Tramadol at 3 AM today. She denies any vomiting, diarrhea or fever. Pt does smoke and drink. PCP: Houston Moseley NP    There are no other complaints, changes, or physical findings at this time. Current Outpatient Prescriptions   Medication Sig Dispense Refill    doxycycline (ADOXA) 100 mg tablet Take 1 Tab by mouth two (2) times a day for 7 days. 14 Tab 0    cholecalciferol (VITAMIN D3) 1,000 unit cap Take 2,000 Units by mouth daily.  cyanocobalamin (VITAMIN B-12) 1,000 mcg tablet Take 1,000 mcg by mouth daily.  gabapentin (NEURONTIN) 300 mg capsule Take 300 mg by mouth daily.  gabapentin (NEURONTIN) 300 mg capsule Take 600 mg by mouth nightly.  traMADol (ULTRAM) 50 mg tablet Take 50 mg by mouth three (3) times daily.  lisinopril (PRINIVIL, ZESTRIL) 5 mg tablet Take 5 mg by mouth daily.  omeprazole (PRILOSEC) 20 mg capsule Take 40 mg by mouth daily.  escitalopram oxalate (LEXAPRO) 20 mg tablet Take 20 mg by mouth daily.  ondansetron hcl (ZOFRAN, AS HYDROCHLORIDE,) 4 mg tablet Take 1 Tab by mouth every eight (8) hours as needed for Nausea. 20 Tab 0    acetaminophen (TYLENOL) 500 mg tablet Take 500 mg by mouth three (3) times daily. Past History     Past Medical History:  Past Medical History:   Diagnosis Date    Adverse effect of anesthesia     passed out when \"she woke me up\"    Anxiety     Arthritis     Calculus of kidney     Chronic pain     arthritis/bursitis both hips/back    Depression     GERD (gastroesophageal reflux disease)     Hypertension     Other ill-defined conditions(799.89)     kidney stones       Past Surgical History:  Past Surgical History:   Procedure Laterality Date    CHEST SURGERY PROCEDURE UNLISTED      right lung lobectomy r/t fungal infection    COLONOSCOPY N/A 12/26/2017    COLONOSCOPY performed by Farheen Lyn MD at [facility]    HX BREAST BIOPSY Left 2002    benign    HX CHOLECYSTECTOMY      HX GYN      partial hysterectomy - still have ovaries    HX ORTHOPAEDIC  4/28/12     L4-5 DECOMPRESSION AND FUSION with hardware    HX OTHER SURGICAL      fatty tumor removal (back)       Family History:  Family History   Problem Relation Age of Onset    Heart Disease Mother     Hypertension Brother     Diabetes Brother     Hypertension Brother     Diabetes Brother     Diabetes Brother     Cancer Brother      \"melanoma that grew inside\"    Cancer Maternal Aunt      lung    Cancer Maternal Uncle      ? where - 8 maternal uncles    Cancer Maternal Aunt      lung    Cancer Maternal Aunt      lung with mets       Social History:  Social History   Substance Use Topics    Smoking status: Current Every Day Smoker     Packs/day: 0.50    Smokeless tobacco: Never Used    Alcohol use Yes      Comment: rare       Allergies: Allergies   Allergen Reactions    Morphine Nausea and Vomiting    Sulfa (Sulfonamide Antibiotics) Hives         Review of Systems   Review of Systems   Constitutional: Negative for chills and fever. HENT: Negative for sore throat. Eyes: Negative for pain. Respiratory: Negative for cough and shortness of breath. Cardiovascular: Negative for chest pain. Gastrointestinal: Positive for nausea. Negative for abdominal pain, diarrhea and vomiting. Genitourinary: Negative for dysuria and hematuria. Musculoskeletal: Negative for arthralgias and myalgias. Skin: Positive for wound (abscess). Negative for rash. Neurological: Negative for dizziness, light-headedness, numbness and headaches. Psychiatric/Behavioral: Negative for behavioral problems and confusion. Physical Exam   Physical Exam   Constitutional: She is oriented to person, place, and time. She appears well-developed and well-nourished. No distress. HENT:   Head: Normocephalic and atraumatic. Right Ear: External ear normal.   Left Ear: External ear normal.   Nose: Nose normal.   Eyes: Conjunctivae and EOM are normal.   Neck: Normal range of motion. Neck supple. Cardiovascular: Normal rate, regular rhythm and normal heart sounds. No murmur heard. Pulmonary/Chest: Effort normal and breath sounds normal. She has no decreased breath sounds. She has no wheezes. Abdominal: Soft. Bowel sounds are normal. She exhibits no distension. There is no tenderness. There is no guarding. Musculoskeletal: Normal range of motion. She exhibits no edema or tenderness. Neurological: She is alert and oriented to person, place, and time. Skin: Skin is warm and dry. She is not diaphoretic. Abscess formation to L perirectal. Surrounding induration and fluctuance measuring approximately 2x3 cm. No current drainage. TTP. Psychiatric: She has a normal mood and affect. Her behavior is normal. Judgment normal.   Nursing note and vitals reviewed. Medical Decision Making   I am the first provider for this patient. I reviewed the vital signs, available nursing notes, past medical history, past surgical history, family history and social history. Vital Signs-Reviewed the patient's vital signs.   Patient Vitals for the past 12 hrs:   Temp Pulse Resp BP SpO2   04/15/18 0934 98.8 °F (37.1 °C) 87 12 154/76 94 %       Records Reviewed: Old Medical Records    Provider Notes (Medical Decision Making):   DDx: abscess, folliculitis, cellulitis, poor hygiene. Patient presents with abscess formation to L perirectal. Will perform I&D and d/c with antibiotics. Packing left in place. Patient instructed to remove packing at home vs return to ED for removal. Recommend f/u with PCP in 2-3 days for wound recheck. ED Course:   Initial assessment performed. The patients presenting problems have been discussed, and they are in agreement with the care plan formulated and outlined with them. I have encouraged them to ask questions as they arise throughout their visit. Progress Note:  10:10 AM   reviewed. Patient does get Tramadol, 50, monthly tabs. Last filled 4/6/2018. Procedure Note - Incision and Drainage:   10:40 AM  Performed by: [employer]  Complexity: simple  Skin prepped with Betadine. Sterile field established. Anesthesia achieved with 1 mLs of Lidocaine 2% with epinephrine using a local infiltration. Abscess to L perirectal was incised with # 11 blade, and 4mLs of purulent drainage was expressed. Wound probed and irrigated. Area was packed using 1 inch iodoform gauze. Sterile dressing applied. Estimated blood loss: less than 5mL  The procedure took 1-15 minutes, and pt tolerated well. Disposition:  Discharge Note:  10:49 AM  The pt is ready for discharge. The pt's signs, symptoms, diagnosis, and discharge instructions have been discussed and pt has conveyed their understanding. The pt is to follow up as recommended or return to ER should their symptoms worsen. Plan has been discussed and pt is in agreement. This note is prepared by Torres Mcdonnell, acting as a Scribe for [employer] PA-C: The scribe's documentation has been prepared under my direction and personally reviewed by me in its entirety.  I confirm that the notes above accurately reflects all work, treatment, procedures, and medical decision making performed by me. PLAN:  1. Discharge home  2. Medications as directed  3. Schedule f/u with PCP in 2-3 days for wound recheck and packing removal  4. Return precautions reviewed    Discharge Medication List as of 4/15/2018 10:47 AM      START taking these medications    Details   doxycycline (ADOXA) 100 mg tablet Take 1 Tab by mouth two (2) times a day for 7 days. , Normal, Disp-14 Tab, R-0         CONTINUE these medications which have NOT CHANGED    Details   cholecalciferol (VITAMIN D3) 1,000 unit cap Take 2,000 Units by mouth daily. , Historical Med      cyanocobalamin (VITAMIN B-12) 1,000 mcg tablet Take 1,000 mcg by mouth daily. , Historical Med      !! gabapentin (NEURONTIN) 300 mg capsule Take 300 mg by mouth daily. , Historical Med      !! gabapentin (NEURONTIN) 300 mg capsule Take 600 mg by mouth nightly., Historical Med      traMADol (ULTRAM) 50 mg tablet Take 50 mg by mouth three (3) times daily. , Historical Med      lisinopril (PRINIVIL, ZESTRIL) 5 mg tablet Take 5 mg by mouth daily. , Historical Med      omeprazole (PRILOSEC) 20 mg capsule Take 40 mg by mouth daily. , Historical Med      escitalopram oxalate (LEXAPRO) 20 mg tablet Take 20 mg by mouth daily. , Historical Med      ondansetron hcl (ZOFRAN, AS HYDROCHLORIDE,) 4 mg tablet Take 1 Tab by mouth every eight (8) hours as needed for Nausea., Normal, Disp-20 Tab, R-0      acetaminophen (TYLENOL) 500 mg tablet Take 500 mg by mouth three (3) times daily. , Historical Med       !! - Potential duplicate medications found. Please discuss with provider.         2.   Follow-up Information     Follow up With Details Comments Contact Info    Magda Álvarez NP In 2 days For wound re-check [address] [phone]      [facility] EMERGENCY DEPT  As needed, If symptoms worsen, For wound re-check [address] [place] [address] [phone]        Return to ED if worse     Diagnosis     Clinical Impression:   1. Abscess        Attestations: This note is prepared by Domingo Esteban, acting as a Scribe for [employer] Linton PA-C: The scribe's documentation has been prepared under my direction and personally reviewed by me in its entirety. I confirm that the notes above accurately reflects all work, treatment, procedures, and medical decision making performed by me.           This note will not be viewable in [employer]

## 2018-04-15 NOTE — DISCHARGE INSTRUCTIONS
Thank you!     Thank you for allowing us to provide you with excellent care today. We hope we addressed all of your concerns and needs. We strive to provide excellent quality care in the Emergency Department. You will receive a survey after your visit to evaluate the care you were provided.      Please rate us a level 5 (excellent), as anything less than excellent does not meet our goals.      If you feel that you have not received excellent quality care or timely care, please ask to speak to the nurse manager. Please choose us in the future for your continued health care needs. ______________________________________________________________________    The exam and treatment you received in the Emergency Department were for an urgent problem and are not intended as complete care. It is important that you follow-up with a doctor, nurse practitioner, or physician assistant to:  (1) confirm your diagnosis,  (2) re-evaluation of changes in your illness and treatment, and  (3) for ongoing care. If your symptoms become worse or you do not improve as expected and you are unable to reach your usual health care provider, you should return to the Emergency Department. We are available 24 hours a day. Take this sheet with you when you go to your follow-up visit. If you have any problem arranging the follow-up visit, contact [address] [place] [address] 21 [phone])    Make an appointment with your Primary Care doctor for follow up of this visit. Return to the ER if you are unable to be seen in the time recommended on your discharge instructions. Skin Abscess: Care Instructions  Your Care Instructions    A skin abscess is a bacterial infection that forms a pocket of pus. A boil is a kind of skin abscess. The doctor may have cut an opening in the abscess so that the pus can drain out. You may have gauze in the cut so that the abscess will stay open and keep draining. You may need antibiotics.  You will need to follow up with your doctor to make sure the infection has gone away. The doctor has checked you carefully, but problems can develop later. If you notice any problems or new symptoms, get medical treatment right away. Follow-up care is a key part of your treatment and safety. Be sure to make and go to all appointments, and call your doctor if you are having problems. It's also a good idea to know your test results and keep a list of the medicines you take. How can you care for yourself at home? · Apply warm and dry compresses, a heating pad set on low, or a hot water bottle 3 or 4 times a day for pain. Keep a cloth between the heat source and your skin. · If your doctor prescribed antibiotics, take them as directed. Do not stop taking them just because you feel better. You need to take the full course of antibiotics. · Take pain medicines exactly as directed. ¨ If the doctor gave you a prescription medicine for pain, take it as prescribed. ¨ If you are not taking a prescription pain medicine, ask your doctor if you can take an over-the-counter medicine. · Keep your bandage clean and dry. Change the bandage whenever it gets wet or dirty, or at least one time a day. · If the abscess was packed with gauze:  ¨ Keep follow-up appointments to have the gauze changed or removed. If the doctor instructed you to remove the gauze, gently pull out all of the gauze when your doctor tells you to. ¨ After the gauze is removed, soak the area in warm water for 15 to 20 minutes 2 times a day, until the wound closes. When should you call for help? Call your doctor now or seek immediate medical care if:  ? · You have signs of worsening infection, such as:  ¨ Increased pain, swelling, warmth, or redness. ¨ Red streaks leading from the infected skin. ¨ Pus draining from the wound. ¨ A fever. ? Watch closely for changes in your health, and be sure to contact your doctor if:  ? · You do not get better as expected. Where can you learn more?   Go to http://colin-hanna.info/. Enter K753 in the search box to learn more about \"Skin Abscess: Care Instructions. \"  Current as of: October 13, 2016  Content Version: 11.4  © 9000-2182 Healthwise, [facility]. Care instructions adapted under license by [employer] (which disclaims liability or warranty for this information). If you have questions about a medical condition or this instruction, always ask your healthcare professional. [place] [address] any warranty or liability for your use of this information.

## 2018-04-20 ENCOUNTER — APPOINTMENT (OUTPATIENT)
Dept: ULTRASOUND IMAGING | Age: 63
End: 2018-04-20
Payer: MEDICARE

## 2018-04-20 ENCOUNTER — HOSPITAL ENCOUNTER (OUTPATIENT)
Dept: MAMMOGRAPHY | Age: 63
Discharge: HOME | End: 2018-04-20
Payer: MEDICARE

## 2018-04-20 DIAGNOSIS — R92.8 ABNORMAL MAMMOGRAM: ICD-10-CM

## 2018-04-20 DIAGNOSIS — N64.89 BREAST ASYMMETRY: ICD-10-CM

## 2018-04-20 PROCEDURE — 77065 DX MAMMO INCL CAD UNI: CPT

## 2018-04-30 RX ORDER — ESCITALOPRAM OXALATE 10 MG/1
10 TABLET ORAL DAILY
COMMUNITY

## 2018-05-01 ENCOUNTER — ANESTHESIA (OUTPATIENT)
Dept: ENDOSCOPY | Age: 63
End: 2018-05-01
Payer: MEDICARE

## 2018-05-01 ENCOUNTER — HOSPITAL ENCOUNTER (OUTPATIENT)
Age: 63
Setting detail: OUTPATIENT SURGERY
Discharge: HOME | End: 2018-05-01
Attending: SPECIALIST | Admitting: SPECIALIST
Payer: MEDICARE

## 2018-05-01 ENCOUNTER — ANESTHESIA EVENT (OUTPATIENT)
Dept: ENDOSCOPY | Age: 63
End: 2018-05-01
Payer: MEDICARE

## 2018-05-01 VITALS
BODY MASS INDEX: 35.47 KG/M2 | RESPIRATION RATE: 24 BRPM | SYSTOLIC BLOOD PRESSURE: 143 MMHG | HEART RATE: 66 BPM | TEMPERATURE: 97.5 F | WEIGHT: 226 LBS | HEIGHT: 67 IN | DIASTOLIC BLOOD PRESSURE: 86 MMHG | OXYGEN SATURATION: 93 %

## 2018-05-01 PROCEDURE — 76060000031 HC ANESTHESIA FIRST 0.5 HR: Performed by: SPECIALIST

## 2018-05-01 PROCEDURE — 74011250636 HC RX REV CODE- 250/636

## 2018-05-01 PROCEDURE — 77030009426 HC FCPS BIOP ENDOSC BSC -B: Performed by: SPECIALIST

## 2018-05-01 PROCEDURE — 88305 TISSUE EXAM BY PATHOLOGIST: CPT | Performed by: SPECIALIST

## 2018-05-01 PROCEDURE — 76040000019: Performed by: SPECIALIST

## 2018-05-01 PROCEDURE — 74011000250 HC RX REV CODE- 250

## 2018-05-01 RX ORDER — ATROPINE SULFATE 0.1 MG/ML
0.5 INJECTION INTRAVENOUS
Status: DISCONTINUED | OUTPATIENT
Start: 2018-05-01 | End: 2018-05-01 | Stop reason: HOSPADM

## 2018-05-01 RX ORDER — PROPOFOL 10 MG/ML
INJECTION, EMULSION INTRAVENOUS AS NEEDED
Status: DISCONTINUED | OUTPATIENT
Start: 2018-05-01 | End: 2018-05-01 | Stop reason: HOSPADM

## 2018-05-01 RX ORDER — SODIUM CHLORIDE 0.9 % (FLUSH) 0.9 %
5-10 SYRINGE (ML) INJECTION EVERY 8 HOURS
Status: DISCONTINUED | OUTPATIENT
Start: 2018-05-01 | End: 2018-05-01 | Stop reason: HOSPADM

## 2018-05-01 RX ORDER — EPINEPHRINE 0.1 MG/ML
1 INJECTION INTRACARDIAC; INTRAVENOUS
Status: DISCONTINUED | OUTPATIENT
Start: 2018-05-01 | End: 2018-05-01 | Stop reason: HOSPADM

## 2018-05-01 RX ORDER — SODIUM CHLORIDE 9 MG/ML
50 INJECTION, SOLUTION INTRAVENOUS CONTINUOUS
Status: DISCONTINUED | OUTPATIENT
Start: 2018-05-01 | End: 2018-05-01 | Stop reason: HOSPADM

## 2018-05-01 RX ORDER — FLUMAZENIL 0.1 MG/ML
0.2 INJECTION INTRAVENOUS
Status: DISCONTINUED | OUTPATIENT
Start: 2018-05-01 | End: 2018-05-01 | Stop reason: HOSPADM

## 2018-05-01 RX ORDER — SODIUM CHLORIDE 9 MG/ML
INJECTION, SOLUTION INTRAVENOUS
Status: DISCONTINUED | OUTPATIENT
Start: 2018-05-01 | End: 2018-05-01 | Stop reason: HOSPADM

## 2018-05-01 RX ORDER — LIDOCAINE HYDROCHLORIDE 20 MG/ML
INJECTION, SOLUTION EPIDURAL; INFILTRATION; INTRACAUDAL; PERINEURAL AS NEEDED
Status: DISCONTINUED | OUTPATIENT
Start: 2018-05-01 | End: 2018-05-01 | Stop reason: HOSPADM

## 2018-05-01 RX ORDER — DEXTROMETHORPHAN/PSEUDOEPHED 2.5-7.5/.8
1.2 DROPS ORAL
Status: DISCONTINUED | OUTPATIENT
Start: 2018-05-01 | End: 2018-05-01 | Stop reason: HOSPADM

## 2018-05-01 RX ORDER — FENTANYL CITRATE 50 UG/ML
200 INJECTION, SOLUTION INTRAMUSCULAR; INTRAVENOUS
Status: DISCONTINUED | OUTPATIENT
Start: 2018-05-01 | End: 2018-05-01 | Stop reason: HOSPADM

## 2018-05-01 RX ORDER — NALOXONE HYDROCHLORIDE 0.4 MG/ML
0.4 INJECTION, SOLUTION INTRAMUSCULAR; INTRAVENOUS; SUBCUTANEOUS
Status: DISCONTINUED | OUTPATIENT
Start: 2018-05-01 | End: 2018-05-01 | Stop reason: HOSPADM

## 2018-05-01 RX ORDER — MIDAZOLAM HYDROCHLORIDE 1 MG/ML
.25-1 INJECTION, SOLUTION INTRAMUSCULAR; INTRAVENOUS
Status: DISCONTINUED | OUTPATIENT
Start: 2018-05-01 | End: 2018-05-01 | Stop reason: HOSPADM

## 2018-05-01 RX ORDER — SODIUM CHLORIDE 0.9 % (FLUSH) 0.9 %
5-10 SYRINGE (ML) INJECTION AS NEEDED
Status: DISCONTINUED | OUTPATIENT
Start: 2018-05-01 | End: 2018-05-01 | Stop reason: HOSPADM

## 2018-05-01 RX ADMIN — PROPOFOL 20 MG: 10 INJECTION, EMULSION INTRAVENOUS at 08:19

## 2018-05-01 RX ADMIN — SODIUM CHLORIDE: 9 INJECTION, SOLUTION INTRAVENOUS at 08:14

## 2018-05-01 RX ADMIN — PROPOFOL 20 MG: 10 INJECTION, EMULSION INTRAVENOUS at 08:17

## 2018-05-01 RX ADMIN — PROPOFOL 30 MG: 10 INJECTION, EMULSION INTRAVENOUS at 08:20

## 2018-05-01 RX ADMIN — PROPOFOL 30 MG: 10 INJECTION, EMULSION INTRAVENOUS at 08:23

## 2018-05-01 RX ADMIN — PROPOFOL 100 MG: 10 INJECTION, EMULSION INTRAVENOUS at 08:15

## 2018-05-01 RX ADMIN — LIDOCAINE HYDROCHLORIDE 100 MG: 20 INJECTION, SOLUTION EPIDURAL; INFILTRATION; INTRACAUDAL; PERINEURAL at 08:14

## 2018-05-01 RX ADMIN — PROPOFOL 30 MG: 10 INJECTION, EMULSION INTRAVENOUS at 08:21

## 2018-05-01 NOTE — H&P
Pre-endoscopy H and P     The patient was seen and examined in the endoscopy suite. The airway was assessed and docuemented. The problem list and medications were reviewed. Patient Active Problem List   Diagnosis Code    Spinal stenosis M48.00     Social History     Social History    Marital status:      Spouse name: N/A    Number of children: N/A    Years of education: N/A     Occupational History    Not on file. Social History Main Topics    Smoking status: Current Every Day Smoker     Packs/day: 0.50    Smokeless tobacco: Never Used    Alcohol use Yes      Comment: rare    Drug use: No    Sexual activity: Not on file     Other Topics Concern    Not on file     Social History Narrative     Past Medical History:   Diagnosis Date    Adverse effect of anesthesia     passed out when \"she woke me up\"    Anxiety     Arthritis     Calculus of kidney     Chronic pain     arthritis/bursitis both hips/back    Depression     GERD (gastroesophageal reflux disease)     Hypertension     Other ill-defined conditions(799.89)     kidney stones         Prior to Admission Medications   Prescriptions Last Dose Informant Patient Reported? Taking?   acetaminophen (TYLENOL) 500 mg tablet 4/24/2018 at Unknown time  Yes Yes   Sig: Take 500 mg by mouth three (3) times daily. cholecalciferol (VITAMIN D3) 1,000 unit cap 4/30/2018 at Unknown time  Yes Yes   Sig: Take 2,000 Units by mouth daily. cyanocobalamin (VITAMIN B-12) 1,000 mcg tablet 4/30/2018 at Unknown time  Yes Yes   Sig: Take 1,000 mcg by mouth daily. escitalopram oxalate (LEXAPRO) 10 mg tablet 4/30/2018 at Unknown time  Yes Yes   Sig: Take 10 mg by mouth daily. gabapentin (NEURONTIN) 300 mg capsule 4/30/2018 at Unknown time  Yes Yes   Sig: Take 300 mg by mouth daily. gabapentin (NEURONTIN) 300 mg capsule   Yes Yes   Sig: Take 900 mg by mouth nightly.    lisinopril (PRINIVIL, ZESTRIL) 5 mg tablet 4/30/2018 at Unknown time  Yes Yes   Sig: Take 5 mg by mouth nightly. omeprazole (PRILOSEC) 20 mg capsule   Yes Yes   Sig: Take 40 mg by mouth daily. ondansetron hcl (ZOFRAN, AS HYDROCHLORIDE,) 4 mg tablet 4/24/2018 at Unknown time  No Yes   Sig: Take 1 Tab by mouth every eight (8) hours as needed for Nausea. traMADol (ULTRAM) 50 mg tablet 4/30/2018 at Unknown time  Yes Yes   Sig: Take 50 mg by mouth three (3) times daily. Facility-Administered Medications: None       Chief complaint, history of present illness, and review of systems and Past medical History are positive for: Dysphagia, GERD and HTN. The heart, lungs and mental status were satisfactory for the administration of sedation and for the procedure. I discussed with the patient the objectives, risks, consequences and alternatives to the procedure.      Plan: Endoscopic procedure with sedation     Landry Rodriguez MD   5/1/2018  8:12 AM

## 2018-05-01 NOTE — ANESTHESIA POSTPROCEDURE EVALUATION
Post-Anesthesia Evaluation and Assessment    Patient: Janee Hays MRN: [id number]  SSN: [id number]    YOB: 1955  Age: 65 y.o. Sex: female       Cardiovascular Function/Vital Signs  Visit Vitals    /86    Pulse 66    Temp 36.4 °C (97.5 °F)    Resp 24    Ht 5' 7\" (1.702 m)    Wt 102.5 kg (226 lb)    SpO2 93%    Breastfeeding No    BMI 35.4 kg/m2       Patient is status post MAC anesthesia for Procedure(s):  ESOPHAGOGASTRODUODENOSCOPY (EGD)  ESOPHAGOGASTRODUODENAL (EGD) BIOPSY  ESOPHAGEAL DILATION. Nausea/Vomiting: None    Postoperative hydration reviewed and adequate. Pain:  Pain Scale 1: Numeric (0 - 10) (05/01/18 0900)  Pain Intensity 1: 0 (05/01/18 0900)   Managed    Neurological Status: At baseline    Mental Status and Level of Consciousness: Arousable    Pulmonary Status:   O2 Device: Room air (05/01/18 0900)   Adequate oxygenation and airway patent    Complications related to anesthesia: None    Post-anesthesia assessment completed.  No concerns    Signed By: Kenton Mcintosh DO     May 1, 2018

## 2018-05-01 NOTE — PROCEDURES
[address]                 NAME:  Mackenzie Dawn   :   [id number]   MRN:   [id number]     Date/Time:  2018 8:28 AM    Esophagogastroduodenoscopy (EGD) Procedure Note    :  Naheed Key MD    Referring Provider:  Markel Castellano NP    Anethesia/Sedation:  MAC anesthesia Propofol    Preoperative diagnosis: DYSPHAGIA, GERD    Postoperative diagnosis: 1. Gastritis  2. Dysphagia  3. Duodenal Lipoma    Procedure Details     After infom consent was obtained for the procedure, with all risks and benefits of procedure explained the patient was taken to the endoscopy suite and placed in the left lateral decubitus position. Following sequential administration of sedation as per above, the [id number] gastroscope was inserted into the mouth and advanced under direct vision to second portion of the duodenum. A careful inspection was made as the gastroscope was withdrawn, including a retroflexed view of the proximal stomach; findings and interventions are described below. Findings:  Esophagus:Normal mucosa, random biopsies done. Esophagus dilated with 51 F Savary dilator. Stomach:Patchy antral erythema, biopsies done. Duodenum/jejunum:12 mm Lipoma in descending duodenum, biopsies done. Therapies:  esophageal dilation with savary sizes 51 F    Specimens: Antral biopsies           EBL: None    Complications:   None; patient tolerated the procedure well. Impression:    See Postoperative diagnosis above    Recommendations:  -Acid suppression with a proton pump inhibitor. , -Await pathology.     Discharge disposition:  Home in the company of  when able to ambulate    Naheed Key MD

## 2018-05-01 NOTE — IP AVS SNAPSHOT
[address] [facility] [address] [place] [address] 
[phone] Patient: Vickie Parsons MRN: [id number] About your hospitalization You were admitted on:  May 1, 2018 You last received care in the:  [facility] ENDOSCOPY You were discharged on:  May 1, 2018 Why you were hospitalized Your primary diagnosis was:  Not on File Follow-up Information None Discharge Orders None A check bethanie indicates which time of day the medication should be taken. My Medications CONTINUE taking these medications Instructions Each Dose to Equal  
 Morning Noon Evening Bedtime  
 acetaminophen 500 mg tablet Commonly known as:  TYLENOL Your last dose was: Your next dose is: Take 500 mg by mouth three (3) times daily. 500 mg  
    
   
   
   
  
 * gabapentin 300 mg capsule Commonly known as:  NEURONTIN Your last dose was: Your next dose is: Take 300 mg by mouth daily. 300 mg  
    
   
   
   
  
 * gabapentin 300 mg capsule Commonly known as:  NEURONTIN Your last dose was: Your next dose is: Take 900 mg by mouth nightly. 900 mg  
    
   
   
   
  
 LEXAPRO 10 mg tablet Generic drug:  escitalopram oxalate Your last dose was: Your next dose is: Take 10 mg by mouth daily. 10 mg  
    
   
   
   
  
 lisinopril 5 mg tablet Commonly known as:  Dorathy Massed Your last dose was: Your next dose is: Take 5 mg by mouth nightly. 5 mg  
    
   
   
   
  
 omeprazole 20 mg capsule Commonly known as:  PRILOSEC Your last dose was: Your next dose is: Take 40 mg by mouth daily. 40 mg  
    
   
   
   
  
 ondansetron hcl 4 mg tablet Commonly known as:  Yasmeen Amaury Your last dose was: Your next dose is: Take 1 Tab by mouth every eight (8) hours as needed for Nausea. 4 mg  
    
   
   
   
  
 traMADol 50 mg tablet Commonly known as:  ULTRAM  
   
Your last dose was: Your next dose is: Take 50 mg by mouth three (3) times daily. 50 mg  
    
   
   
   
  
 VITAMIN B-12 1,000 mcg tablet Generic drug:  cyanocobalamin Your last dose was: Your next dose is: Take 1,000 mcg by mouth daily. 1000 mcg VITAMIN D3 1,000 unit Cap Generic drug:  cholecalciferol Your last dose was: Your next dose is: Take 2,000 Units by mouth daily. 2000 Units * Notice: This list has 2 medication(s) that are the same as other medications prescribed for you. Read the directions carefully, and ask your doctor or other care provider to review them with you. Opioid Education Prescription Opioids: What You Need to Know: 
 
Prescription opioids can be used to help relieve moderate-to-severe pain and are often prescribed following a surgery or injury, or for certain health conditions. These medications can be an important part of treatment but also come with serious risks. Opioids are strong pain medicines. Examples include hydrocodone, oxycodone, fentanyl, and morphine. Heroin is an example of an illegal opioid. It is important to work with your health care provider to make sure you are getting the safest, most effective care. WHAT ARE THE RISKS AND SIDE EFFECTS OF OPIOID USE? Prescription opioids carry serious risks of addiction and overdose, especially with prolonged use. An opioid overdose, often marked by slow breathing, can cause sudden death. The use of prescription opioids can have a number of side effects as well, even when taken as directed. · Tolerance-meaning you might need to take more of a medication for the same pain relief · Physical dependence-meaning you have symptoms of withdrawal when the medication is stopped. Withdrawal symptoms can include nausea, sweating, chills, diarrhea, stomach cramps, and muscle aches. Withdrawal can last up to several weeks, depending on which drug you took and how long you took it. · Increased sensitivity to pain · Constipation · Nausea, vomiting, and dry mouth · Sleepiness and dizziness · Confusion · Depression · Low levels of testosterone that can result in lower sex drive, energy, and strength · Itching and sweating RISKS ARE GREATER WITH:      
· History of drug misuse, substance use disorder, or overdose · Mental health conditions (such as depression or anxiety) · Sleep apnea · Older age (72 years or older) · Pregnancy Avoid alcohol while taking prescription opioids. Also, unless specifically advised by your health care provider, medications to avoid include: · Benzodiazepines (such as Xanax or Valium) · Muscle relaxants (such as Soma or Flexeril) · Hypnotics (such as Ambien or Lunesta) · Other prescription opioids KNOW YOUR OPTIONS Talk to your health care provider about ways to manage your pain that don't involve prescription opioids. Some of these options may actually work better and have fewer risks and side effects. Options may include: 
· Pain relievers such as acetaminophen, ibuprofen, and naproxen · Some medications that are also used for depression or seizures · Physical therapy and exercise · Counseling to help patients learn how to cope better with triggers of pain and stress. · Application of heat or cold compress · Massage therapy · Relaxation techniques Be Informed Make sure you know the name of your medication, how much and how often to take it, and its potential risks & side effects. IF YOU ARE PRESCRIBED OPIOIDS FOR PAIN: 
· Never take opioids in greater amounts or more often than prescribed. Remember the goal is not to be pain-free but to manage your pain at a tolerable level. · Follow up with your primary care provider to: · Work together to create a plan on how to manage your pain. · Talk about ways to help manage your pain that don't involve prescription opioids. · Talk about any and all concerns and side effects. · Help prevent misuse and abuse. · Never sell or share prescription opioids · Help prevent misuse and abuse. · Store prescription opioids in a secure place and out of reach of others (this may include visitors, children, friends, and family). · Safely dispose of unused/unwanted prescription opioids: Find your community drug take-back program or your pharmacy mail-back program, or flush them down the toilet, following guidance from the Food and Drug Administration (www.fda.gov/Drugs/ResourcesForYou). · Visit www.cdc.gov/drugoverdose to learn about the risks of opioid abuse and overdose. · If you believe you may be struggling with addiction, tell your health care provider and ask for guidance or call [address] [place], [place] [address] at [phone]. Discharge Instructions Ugo Bennett [id number] 1955 EGD DISCHARGE INSTRUCTIONS Discomfort: 
Sore throat- throat lozenges or warm salt water gargle 
redness at IV site- apply warm compress to area; if redness or soreness persist- contact your physician Gaseous discomfort- walking, belching will help relieve any discomfort You may not operate a vehicle for 12 hours You may not engage in an occupation involving machinery or appliances for rest of today. You may not drink alcoholic beverages for at least 12 hours Avoid making any critical decisions for at least 24 hour DIET You may resume your regular diet  however -  remember your colon is empty and a heavy meal will produce gas. Avoid these foods:  vegetables, fried / greasy foods, carbonated drinks MEDICATIONS Regarding Aspirin or Nonsteroidal medications specifically, please see below. ACTIVITY You may resume your normal daily activities. Spend the remainder of the day resting -  avoid any strenuous activity. CALL M.D. ANY SIGN OF Increasing pain, nausea, vomiting Abdominal distension (swelling) New increased bleeding (oral or rectal) Fever (chills) Pain in chest area Bloody discharge from nose or mouth Shortness of breath You may not  take any Advil, Aspirin, Ibuprofen, Motrin, Aleve, or Goodys for 10 days, ONLY  Tylenol as needed for pain. Follow-up Instructions: 
 Call Dr. Rebecca Luke Results of procedure / biopsy in 10 days Telephone #  [phone] DISCHARGE SUMMARY from Nurse The following personal items collected during your admission are returned to you:  
Dental Appliance: Dental Appliances: None Vision: Visual Aid: None Hearing Aid:   
Jewelry:   
Clothing:   
Other Valuables:   
Valuables sent to safe:   
 
 
 
 
  
 
 
  
  
  
Introducing \[facility] [place] [facility]\"" & HEALTH SERVICES! Dear Tahira Pualson: Thank you for requesting a Truly account. Our records indicate that you already have an active Truly account. You can access your account anytime at [email or web address] Did you know that you can access your hospital and ER discharge instructions at any time in Truly? You can also review all of your test results from your hospital stay or ER visit. Additional Information If you have questions, please visit the Frequently Asked Questions section of the Truly website at [email or web address] Remember, Truly is NOT to be used for urgent needs. For medical emergencies, dial 911. Now available from your iPhone and Android! Introducing Brayden Elizabeth As a [facility] [place] I wanted to make you aware of our electronic visit tool called Brayden REGISTRAT-MAPImasonUnda. [employer]/7 allows you to connect within minutes with a medical provider 24 hours a day, seven days a week via a mobile device or tablet or logging into a secure website from your computer. You can access Passbox from anywhere in the [place]. A virtual visit might be right for you when you have a simple condition and feel like you just dont want to get out of bed, or cant get away from work for an appointment, when your regular Mariah Aw provider is not available (evenings, weekends or holidays), or when youre out of town and need minor care. Electronic visits cost only $49 and if the [employer]/7 provider determines a prescription is needed to treat your condition, one can be electronically transmitted to a nearby pharmacy*. Please take a moment to enroll today if you have not already done so. The enrollment process is free and takes just a few minutes. To enroll, please download the [employer] davina to your tablet or phone, or visit [email or web address] to enroll on your computer. And, as an [address] [place] [address] patient with a Teranode account, the results of your visits will be scanned into your electronic medical record and your primary care provider will be able to view the scanned results. We urge you to continue to see your regular Mariah [employer] provider for your ongoing medical care. And while your primary care provider may not be the one available when you seek a Passbox virtual visit, the peace of mind you get from getting a real diagnosis real time can be priceless. For more information on Passbox, view our Frequently Asked Questions (FAQs) at [email or web address]. Sincerely, 
 
Murali Irvin MD 
Chief Medical Officer Rica Shelley *:  certain medications cannot be prescribed via Passbox Providers Seen During Your Hospitalization Provider Specialty Primary office phone Naheed Key MD Gastroenterology [phone] Your Primary Care Physician (PCP) Primary Care Physician Office Phone Office Fax [place] [phone] You are allergic to the following Allergen Reactions Morphine Nausea and Vomiting  
    
 Sulfa (Sulfonamide Antibiotics) Hives Recent Documentation Height Weight Breastfeeding? BMI OB Status Smoking Status 1.702 m 102.5 kg No 35.4 kg/m2 Hysterectomy Current Every Day Smoker Emergency Contacts Name Discharge Info Relation Home Work Mobile Natacha Alvarez DISCHARGE CAREGIVER [3] Spouse [3] [phone] Patient Belongings The following personal items are in your possession at time of discharge: 
  Dental Appliances: None  Visual Aid: None Please provide this summary of care documentation to your next provider. Signatures-by signing, you are acknowledging that this After Visit Summary has been reviewed with you and you have received a copy. Patient Signature:  ____________________________________________________________ Date:  ____________________________________________________________  
  
Gearldine Moulds Provider Signature:  ____________________________________________________________ Date:  ____________________________________________________________

## 2018-05-01 NOTE — ANESTHESIA PREPROCEDURE EVALUATION
Anesthetic History   No history of anesthetic complications            Review of Systems / Medical History  Patient summary reviewed, nursing notes reviewed and pertinent labs reviewed    Pulmonary  Within defined limits                 Neuro/Psych   Within defined limits           Cardiovascular    Hypertension              Exercise tolerance: >4 METS     GI/Hepatic/Renal     GERD           Endo/Other        Obesity     Other Findings              Physical Exam    Airway  Mallampati: II  TM Distance: > 6 cm  Neck ROM: normal range of motion   Mouth opening: Normal     Cardiovascular  Regular rate and rhythm,  S1 and S2 normal,  no murmur, click, rub, or gallop             Dental  No notable dental hx       Pulmonary  Breath sounds clear to auscultation               Abdominal  GI exam deferred       Other Findings            Anesthetic Plan    ASA: 2  Anesthesia type: MAC          Induction: Intravenous  Anesthetic plan and risks discussed with: Patient

## 2018-05-01 NOTE — DISCHARGE INSTRUCTIONS
Rainell Litten  [id number]  1955    EGD DISCHARGE INSTRUCTIONS  Discomfort:  Sore throat- throat lozenges or warm salt water gargle  redness at IV site- apply warm compress to area; if redness or soreness persist- contact your physician  Gaseous discomfort- walking, belching will help relieve any discomfort  You may not operate a vehicle for 12 hours  You may not engage in an occupation involving machinery or appliances for rest of today. You may not drink alcoholic beverages for at least 12 hours  Avoid making any critical decisions for at least 24 hour  DIET  You may resume your regular diet - however -  remember your colon is empty and a heavy meal will produce gas. Avoid these foods:  vegetables, fried / greasy foods, carbonated drinks  MEDICATIONS   Regarding Aspirin or Nonsteroidal medications specifically, please see below. ACTIVITY  You may resume your normal daily activities. Spend the remainder of the day resting -  avoid any strenuous activity. CALL M.D. ANY SIGN OF   Increasing pain, nausea, vomiting  Abdominal distension (swelling)  New increased bleeding (oral or rectal)  Fever (chills)  Pain in chest area  Bloody discharge from nose or mouth  Shortness of breath    You may not  take any Advil, Aspirin, Ibuprofen, Motrin, Aleve, or Goodys for 10 days, ONLY  Tylenol as needed for pain.     Follow-up Instructions:   Call Dr. Rosette Fairchild  Results of procedure / biopsy in 10 days  Telephone #  [phone]        DISCHARGE SUMMARY from Nurse    The following personal items collected during your admission are returned to you:   Dental Appliance: Dental Appliances: None  Vision: Visual Aid: None  Hearing Aid:    Jewelry:    Clothing:    Other Valuables:    Valuables sent to safe:

## 2019-01-20 ENCOUNTER — APPOINTMENT (OUTPATIENT)
Dept: GENERAL RADIOLOGY | Age: 64
End: 2019-01-20
Attending: EMERGENCY MEDICINE
Payer: MEDICARE

## 2019-01-20 ENCOUNTER — HOSPITAL ENCOUNTER (EMERGENCY)
Age: 64
Discharge: HOME | End: 2019-01-20
Attending: EMERGENCY MEDICINE
Payer: MEDICARE

## 2019-01-20 VITALS
BODY MASS INDEX: 34.05 KG/M2 | TEMPERATURE: 97.7 F | HEART RATE: 66 BPM | WEIGHT: 216.93 LBS | HEIGHT: 67 IN | RESPIRATION RATE: 16 BRPM | DIASTOLIC BLOOD PRESSURE: 71 MMHG | SYSTOLIC BLOOD PRESSURE: 161 MMHG | OXYGEN SATURATION: 98 %

## 2019-01-20 DIAGNOSIS — S49.91XA SHOULDER INJURY, RIGHT, INITIAL ENCOUNTER: Primary | ICD-10-CM

## 2019-01-20 PROCEDURE — 74011250637 HC RX REV CODE- 250/637: Performed by: PHYSICIAN ASSISTANT

## 2019-01-20 PROCEDURE — 99283 EMERGENCY DEPT VISIT LOW MDM: CPT

## 2019-01-20 PROCEDURE — 73030 X-RAY EXAM OF SHOULDER: CPT

## 2019-01-20 RX ORDER — TRAMADOL HYDROCHLORIDE 50 MG/1
50 TABLET ORAL
Status: DISCONTINUED | OUTPATIENT
Start: 2019-01-20 | End: 2019-01-20 | Stop reason: HOSPADM

## 2019-01-20 RX ORDER — NAPROXEN 250 MG/1
500 TABLET ORAL
Status: COMPLETED | OUTPATIENT
Start: 2019-01-20 | End: 2019-01-20

## 2019-01-20 RX ORDER — NAPROXEN 500 MG/1
500 TABLET ORAL 2 TIMES DAILY WITH MEALS
Qty: 60 TAB | Refills: 0 | Status: SHIPPED | OUTPATIENT
Start: 2019-01-20 | End: 2019-02-19

## 2019-01-20 RX ORDER — TRAMADOL HYDROCHLORIDE 50 MG/1
50 TABLET ORAL
Qty: 10 TAB | Refills: 0 | Status: SHIPPED | OUTPATIENT
Start: 2019-01-20

## 2019-01-20 RX ADMIN — NAPROXEN 500 MG: 250 TABLET ORAL at 18:08

## 2019-01-20 RX ADMIN — TRAMADOL HYDROCHLORIDE 50 MG: 50 TABLET, FILM COATED ORAL at 18:08

## 2019-01-20 NOTE — LETTER
[address] 
[facility] EMERGENCY DEPT 
[address] [place] [address] [facility] [id number] [phone] Work/School Note Date: 1/20/2019 To Whom It May concern: 
 
Yareli Vargas was seen and treated today in the emergency room by the following provider(s): 
Attending Provider: Barbera Collet, MD 
Physician Assistant: LUZ Garcia. Yareli Vargas may return to work on 1/23/19 or sooner, if feeling better. Sincerely, Gaby Méndez., PA

## 2019-01-20 NOTE — ED PROVIDER NOTES
EMERGENCY DEPARTMENT HISTORY AND PHYSICAL EXAM 
     
 
Date: 1/20/2019 Patient Name: Kimber Packer History of Presenting Illness Chief Complaint Patient presents with  Shoulder Injury  
  fell on right shoulder this morning, GLF; reports pain and stiffness after waking up History Provided By: Patient and Patient's  HPI: Kimber Packer is a 59 y.o. female, pmhx chronic pain, HTN, who presents ambulatory to the ED c/o right shoulder pain S/P a GLF this morning at work. Pt denies hitting her head/LOC. Pt went home and went to sleep and pain was worse after waking up. She tried Tylenol and muscle relaxer with no relief. She specifically denies any recent fevers, chills, nausea, vomiting, diarrhea, abd pain, CP, urinary sxs, changes in BM, or headache. She denies history of diabetes, kidney disease, liver disease, thyroid disease PCP: Cassandra Mahoney, NP There are no other complaints, changes, or physical findings at this time. Current Outpatient Medications Medication Sig Dispense Refill  escitalopram oxalate (LEXAPRO) 10 mg tablet Take 10 mg by mouth daily.  ondansetron hcl (ZOFRAN, AS HYDROCHLORIDE,) 4 mg tablet Take 1 Tab by mouth every eight (8) hours as needed for Nausea. 20 Tab 0  cholecalciferol (VITAMIN D3) 1,000 unit cap Take 2,000 Units by mouth daily.  cyanocobalamin (VITAMIN B-12) 1,000 mcg tablet Take 1,000 mcg by mouth daily.  gabapentin (NEURONTIN) 300 mg capsule Take 300 mg by mouth daily.  gabapentin (NEURONTIN) 300 mg capsule Take 900 mg by mouth nightly.  traMADol (ULTRAM) 50 mg tablet Take 50 mg by mouth three (3) times daily.  lisinopril (PRINIVIL, ZESTRIL) 5 mg tablet Take 5 mg by mouth nightly.  acetaminophen (TYLENOL) 500 mg tablet Take 500 mg by mouth three (3) times daily.  omeprazole (PRILOSEC) 20 mg capsule Take 40 mg by mouth daily. Past History Past Medical History: Past Medical History:  
Diagnosis Date  Adverse effect of anesthesia   
 passed out when \"she woke me up\"  Anxiety  Arthritis  Calculus of kidney  Chronic pain   
 arthritis/bursitis both hips/back  Depression  GERD (gastroesophageal reflux disease)  Hypertension  Other ill-defined conditions(799.89)   
 kidney stones Past Surgical History: 
Past Surgical History:  
Procedure Laterality Date  CHEST SURGERY PROCEDURE UNLISTED    
 right lung lobectomy r/t fungal infection  COLONOSCOPY N/A 12/26/2017 COLONOSCOPY performed by Christina Hall MD at [facility] ENDOSCOPY  
 HX BREAST BIOPSY Left 2002  
 benign  HX CHOLECYSTECTOMY  HX GYN    
 partial hysterectomy - still have ovaries  HX ORTHOPAEDIC  4/28/12 L4-5 DECOMPRESSION AND FUSION with hardware  HX OTHER SURGICAL    
 fatty tumor removal (back) Family History: 
Family History Problem Relation Age of Onset  Heart Disease Mother  Hypertension Brother  Diabetes Brother  Hypertension Brother  Diabetes Brother  Diabetes Brother  Cancer Brother \"melanoma that grew inside\"  Cancer Maternal Aunt   
     lung  Cancer Maternal Uncle ? where - 8 maternal uncles  Cancer Maternal Aunt   
     lung  Cancer Maternal Aunt   
     lung with mets Social History: 
Social History Tobacco Use  Smoking status: Current Every Day Smoker Packs/day: 0.50  Smokeless tobacco: Never Used Substance Use Topics  Alcohol use: Yes Comment: rare  Drug use: No  
 
 
Allergies: Allergies Allergen Reactions  Morphine Nausea and Vomiting  Sulfa (Sulfonamide Antibiotics) Hives Review of Systems Review of Systems Constitutional: Negative for activity change, appetite change, fatigue and fever. HENT: Negative for congestion, dental problem, ear pain, rhinorrhea and sinus pressure. Eyes: Negative for photophobia, pain, discharge, redness, itching and visual disturbance. Respiratory: Negative for cough, chest tightness, shortness of breath, wheezing and stridor. Cardiovascular: Negative for chest pain and leg swelling. Gastrointestinal: Negative for abdominal distention, abdominal pain and blood in stool. Genitourinary: Negative for difficulty urinating, dysuria, flank pain, frequency, vaginal bleeding, vaginal discharge and vaginal pain. Musculoskeletal: Positive for arthralgias and myalgias. Negative for back pain, gait problem, joint swelling, neck pain and neck stiffness. Skin: Negative for rash and wound. Neurological: Negative for dizziness, syncope, weakness, numbness and headaches. Psychiatric/Behavioral: Negative for behavioral problems. The patient is not nervous/anxious. Physical Exam  
Physical Exam  
Constitutional: She is oriented to person, place, and time. Vital signs are normal. She appears well-developed and well-nourished. Ill appearance: .nopw. No distress. Ambulatory, overweight HENT:  
Head: Normocephalic and atraumatic. Right Ear: External ear normal.  
Left Ear: External ear normal.  
Nose: Nose normal.  
Eyes: Conjunctivae and EOM are normal. Pupils are equal, round, and reactive to light. Right eye exhibits no discharge. Left eye exhibits no discharge. No scleral icterus. Neck: Normal range of motion. Neck supple. Cardiovascular: Normal rate, regular rhythm, normal heart sounds and intact distal pulses. Exam reveals no gallop and no friction rub. No murmur heard. Pulmonary/Chest: Effort normal and breath sounds normal. No respiratory distress. She has no wheezes. She has no rales. She exhibits no tenderness. Abdominal: Soft. Bowel sounds are normal.  
Musculoskeletal: She exhibits no edema.   
     Right shoulder: She exhibits decreased range of motion, tenderness and pain. She exhibits no bony tenderness, no swelling, no deformity and no spasm. Left shoulder: Normal.  
     Right elbow: Normal. 
     Left elbow: Normal.  
     Right wrist: Normal.  
     Left wrist: Normal.  
     Right hip: Normal.  
     Left hip: Normal.  
     Right knee: Normal.  
     Left knee: Normal.  
     Right ankle: Normal.  
     Left ankle: Normal.  
     Cervical back: Normal.  
     Thoracic back: Normal.  
     Lumbar back: Normal.  
     Right hand: Normal.  
Neurological: She is alert and oriented to person, place, and time. No cranial nerve deficit. Skin: Skin is warm and dry. No rash noted. No erythema. Psychiatric: She has a normal mood and affect. Her behavior is normal.  
Nursing note and vitals reviewed. Diagnostic Study Results Labs - No results found for this or any previous visit (from the past 12 hour(s)). Radiologic Studies - XR SHOULDER RT AP/LAT MIN 2 V (Final result) Result time 01/20/19 17:24:40 Final result by Katlyn Alvarez MD (01/20/19 17:24:40) Impression:  
 IMPRESSION:  No acute abnormality. 
   
  
  
  
Narrative: EXAM:  XR SHOULDER RT AP/LAT MIN 2 V 
 
INDICATION:   fall/injury COMPARISON: None. FINDINGS: 3 views of the right shoulder were obtained. Nondiagnostic axillary 
view. Normal alignment. No evidence of acute fracture. No significant 
degenerative disease. No gross soft tissue abnormalities.  
  
  
  
   
 
 
XR SHOULDER RT AP/LAT MIN 2 V Final Result IMPRESSION:  No acute abnormality. CT Results  (Last 48 hours) None CXR Results  (Last 48 hours) None Medical Decision Making I am the first provider for this patient. I reviewed the vital signs, available nursing notes, past medical history, past surgical history, family history and social history. Vital Signs-Reviewed the patient's vital signs. Patient Vitals for the past 12 hrs: Temp Pulse Resp BP SpO2  
01/20/19 1655 97.7 °F (36.5 °C) 66 16 161/71 98 % Records Reviewed: Nursing Notes, Old Medical Records and  - no current narcotics Provider Notes (Medical Decision Making): DDx: sprain, strain, fx 
 
ED Course:  
Initial assessment performed. The patients presenting problems have been discussed, and they are in agreement with the care plan formulated and outlined with them. I have encouraged them to ask questions as they arise throughout their visit. PROGRESS NOTE: 
  
Offered sling, pt defers. 6:09 PM 
Pt noted to be ready for discharge. Updated pt and/or family on imaging findings available. Will follow up as instructed. All questions have been answered, pt voiced understanding and agreement with plan. Narcotics were prescribed, pt was advised not to drive or operate heavy machinery. Specific return precautions provided as well as instructions to return to the ED should sx worsen at any time. Vital signs stable for discharge. ANA Amaya Disposition: 
 
DISCHARGE NOTE: 
6:10 PM 
The patient's results have been reviewed with family and/or caregiver. They verbally convey their understanding and agreement of the patient's signs, symptoms, diagnosis, treatment, and prognosis. They additionally agree to follow up as recommended in the discharge instructions or to return to the Emergency Room should the patient's condition change prior to their follow-up appointment. The family and/or caregiver verbally agrees with the care-plan and all of their questions have been answered. The discharge instructions have also been provided to the them along with educational information regarding the patient's diagnosis and a list of reasons why the patient would want to return to the ER prior to their follow-up appointment should their condition change. ANA Amaya PLAN: 
1. Current Discharge Medication List  
  
START taking these medications Details naproxen (NAPROSYN) 500 mg tablet Take 1 Tab by mouth two (2) times daily (with meals) for 30 days. Qty: 60 Tab, Refills: 0 CONTINUE these medications which have CHANGED Details  
traMADol (ULTRAM) 50 mg tablet Take 1 Tab by mouth every six (6) hours as needed for Pain. Max Daily Amount: 200 mg. Qty: 10 Tab, Refills: 0 Associated Diagnoses: Shoulder injury, right, initial encounter CONTINUE these medications which have NOT CHANGED Details  
escitalopram oxalate (LEXAPRO) 10 mg tablet Take 10 mg by mouth daily. ondansetron hcl (ZOFRAN, AS HYDROCHLORIDE,) 4 mg tablet Take 1 Tab by mouth every eight (8) hours as needed for Nausea. Qty: 20 Tab, Refills: 0  
  
cholecalciferol (VITAMIN D3) 1,000 unit cap Take 2,000 Units by mouth daily. cyanocobalamin (VITAMIN B-12) 1,000 mcg tablet Take 1,000 mcg by mouth daily. !! gabapentin (NEURONTIN) 300 mg capsule Take 300 mg by mouth daily. !! gabapentin (NEURONTIN) 300 mg capsule Take 900 mg by mouth nightly. lisinopril (PRINIVIL, ZESTRIL) 5 mg tablet Take 5 mg by mouth nightly. acetaminophen (TYLENOL) 500 mg tablet Take 500 mg by mouth three (3) times daily. omeprazole (PRILOSEC) 20 mg capsule Take 40 mg by mouth daily. !! - Potential duplicate medications found. Please discuss with provider. 2.  
Follow-up Information Follow up With Specialties Details Why Contact Info Martha García NP Nurse Practitioner   [address] [place] [address] 
[phone] [facility] EMERGENCY DEPT Emergency Medicine  If symptoms worsen [address] [facility] [address] [facility] 
[phone] Lee Colin MD Orthopedic Surgery  shoulder specialist, As needed [address] 
[phone] Return to ED if worse Diagnosis Clinical Impression: 1. Shoulder injury, right, initial encounter This note will not be viewable in [address]

## 2019-01-20 NOTE — ED NOTES
LUZ Wilcox reviewed discharge instructions with the patient. The patient verbalized understanding. All questions and concerns were addressed. The patient declined a wheelchair and is discharged ambulatory in the care of family members with instructions and prescriptions in hand. Pt is alert and oriented x 4. Respirations are clear and unlabored.

## 2019-08-19 ENCOUNTER — HOSPITAL ENCOUNTER (OUTPATIENT)
Dept: MAMMOGRAPHY | Age: 64
Discharge: HOME | End: 2019-08-19
Attending: NURSE PRACTITIONER
Payer: MEDICARE

## 2019-08-19 DIAGNOSIS — Z12.39 BREAST SCREENING: ICD-10-CM

## 2019-08-19 PROCEDURE — 77067 SCR MAMMO BI INCL CAD: CPT

## 2019-12-11 ENCOUNTER — HOSPITAL ENCOUNTER (OUTPATIENT)
Dept: ULTRASOUND IMAGING | Age: 64
Discharge: HOME | End: 2019-12-11
Attending: NURSE PRACTITIONER
Payer: MEDICARE

## 2019-12-11 DIAGNOSIS — N95.0 POSTMENOPAUSAL BLEEDING: ICD-10-CM

## 2019-12-11 PROCEDURE — 76830 TRANSVAGINAL US NON-OB: CPT

## 2019-12-11 PROCEDURE — 76856 US EXAM PELVIC COMPLETE: CPT

## 2020-07-13 ENCOUNTER — HOSPITAL ENCOUNTER (OUTPATIENT)
Dept: MRI IMAGING | Age: 65
Discharge: HOME | End: 2020-07-13
Attending: SPECIALIST
Payer: MEDICARE

## 2020-07-13 VITALS — WEIGHT: 216.05 LBS | BODY MASS INDEX: 33.84 KG/M2

## 2020-07-13 DIAGNOSIS — M47.816 LUMBAR SPONDYLOSIS: ICD-10-CM

## 2020-07-13 DIAGNOSIS — M54.16 LUMBAR RADICULOPATHY: ICD-10-CM

## 2020-07-13 DIAGNOSIS — M96.1 POSTLAMINECTOMY SYNDROME, LUMBAR REGION: ICD-10-CM

## 2020-07-13 DIAGNOSIS — M48.061 SPINAL STENOSIS, LUMBAR REGION, WITHOUT NEUROGENIC CLAUDICATION: ICD-10-CM

## 2020-07-13 PROCEDURE — A9575 INJ GADOTERATE MEGLUMI 0.1ML: HCPCS | Performed by: SPECIALIST

## 2020-07-13 PROCEDURE — 72158 MRI LUMBAR SPINE W/O & W/DYE: CPT

## 2020-07-13 PROCEDURE — 74011250636 HC RX REV CODE- 250/636: Performed by: SPECIALIST

## 2020-07-13 RX ORDER — GADOTERATE MEGLUMINE 376.9 MG/ML
19 INJECTION INTRAVENOUS ONCE
Status: COMPLETED | OUTPATIENT
Start: 2020-07-13 | End: 2020-07-13

## 2020-07-13 RX ADMIN — GADOTERATE MEGLUMINE 19 ML: 376.9 INJECTION INTRAVENOUS at 08:25

## 2020-12-30 ENCOUNTER — TRANSCRIBE ORDER (OUTPATIENT)
Dept: SCHEDULING | Age: 65
End: 2020-12-30

## 2020-12-30 DIAGNOSIS — Z12.31 VISIT FOR SCREENING MAMMOGRAM: Primary | ICD-10-CM

## 2021-01-05 ENCOUNTER — TRANSCRIBE ORDER (OUTPATIENT)
Dept: SCHEDULING | Age: 66
End: 2021-01-05

## 2021-01-05 DIAGNOSIS — M54.50 LOW BACK PAIN: Primary | ICD-10-CM

## 2021-01-20 ENCOUNTER — HOSPITAL ENCOUNTER (OUTPATIENT)
Dept: MRI IMAGING | Age: 66
Discharge: HOME | End: 2021-01-20
Attending: NURSE PRACTITIONER
Payer: MEDICARE

## 2021-01-20 DIAGNOSIS — M54.50 LOW BACK PAIN: ICD-10-CM

## 2021-01-20 PROCEDURE — 72148 MRI LUMBAR SPINE W/O DYE: CPT

## 2021-06-20 ENCOUNTER — HOSPITAL ENCOUNTER (EMERGENCY)
Age: 66
Discharge: HOME | End: 2021-06-20
Attending: EMERGENCY MEDICINE
Payer: MEDICARE

## 2021-06-20 ENCOUNTER — APPOINTMENT (OUTPATIENT)
Dept: CT IMAGING | Age: 66
End: 2021-06-20
Attending: EMERGENCY MEDICINE
Payer: MEDICARE

## 2021-06-20 VITALS
BODY MASS INDEX: 31.8 KG/M2 | TEMPERATURE: 98.4 F | RESPIRATION RATE: 18 BRPM | SYSTOLIC BLOOD PRESSURE: 127 MMHG | HEART RATE: 73 BPM | WEIGHT: 202.6 LBS | DIASTOLIC BLOOD PRESSURE: 59 MMHG | OXYGEN SATURATION: 97 % | HEIGHT: 67 IN

## 2021-06-20 DIAGNOSIS — N93.9 VAGINAL BLEEDING: Primary | ICD-10-CM

## 2021-06-20 LAB
ALBUMIN SERPL-MCNC: 3.5 G/DL (ref 3.5–5)
ALBUMIN/GLOB SERPL: 0.9 {RATIO} (ref 1.1–2.2)
ALP SERPL-CCNC: 66 U/L (ref 45–117)
ALT SERPL-CCNC: 88 U/L (ref 12–78)
ANION GAP SERPL CALC-SCNC: 3 MMOL/L (ref 5–15)
APPEARANCE UR: ABNORMAL
AST SERPL-CCNC: 53 U/L (ref 15–37)
BACTERIA URNS QL MICRO: ABNORMAL /HPF
BASOPHILS # BLD: 0.1 K/UL (ref 0–0.1)
BASOPHILS NFR BLD: 1 % (ref 0–1)
BILIRUB SERPL-MCNC: 0.4 MG/DL (ref 0.2–1)
BILIRUB UR QL CFM: NEGATIVE
BUN SERPL-MCNC: 11 MG/DL (ref 6–20)
BUN/CREAT SERPL: 13 (ref 12–20)
CALCIUM SERPL-MCNC: 8.6 MG/DL (ref 8.5–10.1)
CHLORIDE SERPL-SCNC: 102 MMOL/L (ref 97–108)
CO2 SERPL-SCNC: 31 MMOL/L (ref 21–32)
COLOR UR: ABNORMAL
CREAT SERPL-MCNC: 0.84 MG/DL (ref 0.55–1.02)
DIFFERENTIAL METHOD BLD: ABNORMAL
EOSINOPHIL # BLD: 0.1 K/UL (ref 0–0.4)
EOSINOPHIL NFR BLD: 2 % (ref 0–7)
EPITH CASTS URNS QL MICRO: ABNORMAL /LPF
ERYTHROCYTE [DISTWIDTH] IN BLOOD BY AUTOMATED COUNT: 12.9 % (ref 11.5–14.5)
GLOBULIN SER CALC-MCNC: 4 G/DL (ref 2–4)
GLUCOSE SERPL-MCNC: 94 MG/DL (ref 65–100)
GLUCOSE UR STRIP.AUTO-MCNC: NEGATIVE MG/DL
HCT VFR BLD AUTO: 46.6 % (ref 35–47)
HGB BLD-MCNC: 15.5 G/DL (ref 11.5–16)
HGB UR QL STRIP: ABNORMAL
IMM GRANULOCYTES # BLD AUTO: 0 K/UL (ref 0–0.04)
IMM GRANULOCYTES NFR BLD AUTO: 1 % (ref 0–0.5)
KETONES UR QL STRIP.AUTO: ABNORMAL MG/DL
LEUKOCYTE ESTERASE UR QL STRIP.AUTO: ABNORMAL
LYMPHOCYTES # BLD: 1.6 K/UL (ref 0.8–3.5)
LYMPHOCYTES NFR BLD: 31 % (ref 12–49)
MCH RBC QN AUTO: 32 PG (ref 26–34)
MCHC RBC AUTO-ENTMCNC: 33.3 G/DL (ref 30–36.5)
MCV RBC AUTO: 96.1 FL (ref 80–99)
MONOCYTES # BLD: 0.5 K/UL (ref 0–1)
MONOCYTES NFR BLD: 9 % (ref 5–13)
NEUTS SEG # BLD: 3 K/UL (ref 1.8–8)
NEUTS SEG NFR BLD: 56 % (ref 32–75)
NITRITE UR QL STRIP.AUTO: NEGATIVE
NRBC # BLD: 0 K/UL (ref 0–0.01)
NRBC BLD-RTO: 0 PER 100 WBC
PH UR STRIP: 5.5 [PH] (ref 5–8)
PLATELET # BLD AUTO: 199 K/UL (ref 150–400)
PMV BLD AUTO: 9.5 FL (ref 8.9–12.9)
POTASSIUM SERPL-SCNC: 3.9 MMOL/L (ref 3.5–5.1)
PROT SERPL-MCNC: 7.5 G/DL (ref 6.4–8.2)
PROT UR STRIP-MCNC: 100 MG/DL
RBC # BLD AUTO: 4.85 M/UL (ref 3.8–5.2)
RBC #/AREA URNS HPF: ABNORMAL /HPF (ref 0–5)
SODIUM SERPL-SCNC: 136 MMOL/L (ref 136–145)
SP GR UR REFRACTOMETRY: 1.03 (ref 1–1.03)
UA: UC IF INDICATED,UAUC: ABNORMAL
UROBILINOGEN UR QL STRIP.AUTO: 1 EU/DL (ref 0.2–1)
WBC # BLD AUTO: 5.4 K/UL (ref 3.6–11)
WBC URNS QL MICRO: ABNORMAL /HPF (ref 0–4)

## 2021-06-20 PROCEDURE — 99284 EMERGENCY DEPT VISIT MOD MDM: CPT

## 2021-06-20 PROCEDURE — 36415 COLL VENOUS BLD VENIPUNCTURE: CPT

## 2021-06-20 PROCEDURE — 85025 COMPLETE CBC W/AUTO DIFF WBC: CPT

## 2021-06-20 PROCEDURE — 81001 URINALYSIS AUTO W/SCOPE: CPT

## 2021-06-20 PROCEDURE — 74177 CT ABD & PELVIS W/CONTRAST: CPT

## 2021-06-20 PROCEDURE — 80053 COMPREHEN METABOLIC PANEL: CPT

## 2021-06-20 PROCEDURE — 74011000636 HC RX REV CODE- 636: Performed by: EMERGENCY MEDICINE

## 2021-06-20 RX ADMIN — IOPAMIDOL 100 ML: 755 INJECTION, SOLUTION INTRAVENOUS at 09:29

## 2021-06-20 NOTE — ED NOTES
Haylee reviewed discharge instructions with the patient. The patient verbalized understanding. All questions and concerns were addressed. The patient is discharged via wheelchair in the care of family members with instructions and prescriptions in hand. Pt is alert and oriented x 4. Respirations are clear and unlabored.

## 2021-06-20 NOTE — ED PROVIDER NOTES
EMERGENCY DEPARTMENT HISTORY AND PHYSICAL EXAM      Date: 6/20/2021  Patient Name: Neal Ham    Please note that this dictation was completed with [employer], the computer voice recognition software. Quite often unanticipated grammatical, syntax, homophones, and other interpretive errors are inadvertently transcribed by the computer software. Please disregard these errors. Please excuse any errors that have escaped final proofreading. History of Presenting Illness     Chief Complaint   Patient presents with    Vaginal Bleeding     Patient ambulatory to triage w c/o vaginal bleeding x 3 days, she thinks she may have had a cyst to rupture. History Provided By: Patient     HPI: Neal Ham, 60 y.o. female, pmh htn, anxiety, depression presenting with vaginal bleeding or urethral bleeding. Patient states she has been having bleeding from her pelvic region she is unsure if it is coming from her vagina or from her urethra. She states she had a fall on Friday and hit her right flank and symptoms started after that. She has a history of a partial hysterectomy. Reports some lower cramping in her pelvis. Did also have recent intercourse prior to the onset of bleeding. Patient denies any fever, chills, cough. Not on any anticoagulation. PCP: Nuris Powell NP    No current facility-administered medications on file prior to encounter. Current Outpatient Medications on File Prior to Encounter   Medication Sig Dispense Refill    traMADol (ULTRAM) 50 mg tablet Take 1 Tab by mouth every six (6) hours as needed for Pain. Max Daily Amount: 200 mg. 10 Tab 0    escitalopram oxalate (LEXAPRO) 10 mg tablet Take 10 mg by mouth daily.  ondansetron hcl (ZOFRAN, AS HYDROCHLORIDE,) 4 mg tablet Take 1 Tab by mouth every eight (8) hours as needed for Nausea. 20 Tab 0    cholecalciferol (VITAMIN D3) 1,000 unit cap Take 2,000 Units by mouth daily.       cyanocobalamin (VITAMIN B-12) 1,000 mcg tablet Take 1,000 mcg by mouth daily.  gabapentin (NEURONTIN) 300 mg capsule Take 300 mg by mouth daily.  gabapentin (NEURONTIN) 300 mg capsule Take 900 mg by mouth nightly.  lisinopril (PRINIVIL, ZESTRIL) 5 mg tablet Take 5 mg by mouth nightly.  acetaminophen (TYLENOL) 500 mg tablet Take 500 mg by mouth three (3) times daily.  omeprazole (PRILOSEC) 20 mg capsule Take 40 mg by mouth daily. Past History     Past Medical History:  Past Medical History:   Diagnosis Date    Adverse effect of anesthesia     passed out when \"she woke me up\"    Anxiety     Arthritis     Calculus of kidney     Chronic pain     arthritis/bursitis both hips/back    Depression     GERD (gastroesophageal reflux disease)     Hypertension     Other ill-defined conditions(089.82)     kidney stones       Past Surgical History:  Past Surgical History:   Procedure Laterality Date    CHEST SURGERY PROCEDURE UNLISTED      right lung lobectomy r/t fungal infection    COLONOSCOPY N/A 12/26/2017    [facility] at [facility] END[facility]COPY    HX BREAST BIOPSY Left 2002    benign    HX CHOLECYSTECTOMY      HX GYN      partial hysterectomy - still have ovaries    HX ORTHOPAEDIC  4/28/12     L4-5 DECOMPRESSION AND FUSION with hardware    HX OTHER SURGICAL      fatty tumor removal (back)       Family History:  Family History   Problem Relation Age of Onset    Heart Disease Mother     Hypertension Brother     Diabetes Brother     Hypertension Brother     Diabetes Brother     Diabetes Brother     Cancer Brother         \"melanoma that grew inside\"    Cancer Maternal Aunt         lung    Cancer Maternal Uncle         ?  where - 8 maternal uncles    Cancer Maternal Aunt         lung    Cancer Maternal Aunt         lung with mets       Social History:  Social History     Tobacco Use    Smoking status: Current Every Day Smoker     Packs/day: 0.50    Smokeless tobacco: Never Used   Substance Use Topics    Alcohol use: Yes     Comment: rare    Drug use: No       Allergies: Allergies   Allergen Reactions    Morphine Nausea and Vomiting    Sulfa (Sulfonamide Antibiotics) Hives         Review of Systems   Review of Systems   Constitutional: Negative for chills and fever. HENT: Negative for congestion and sore throat. Eyes: Negative for visual disturbance. Respiratory: Negative for cough and shortness of breath. Cardiovascular: Negative for chest pain and leg swelling. Gastrointestinal: Negative for abdominal pain, blood in stool, diarrhea and nausea. Endocrine: Negative for polyuria. Genitourinary: Positive for vaginal bleeding. Negative for dysuria, flank pain and vaginal discharge. Musculoskeletal: Negative for myalgias. Skin: Negative for rash. Allergic/Immunologic: Negative for immunocompromised state. Neurological: Negative for weakness and headaches. Psychiatric/Behavioral: Negative for confusion. Physical Exam   Physical Exam  Vitals and nursing note reviewed. Constitutional:       Appearance: She is well-developed. HENT:      Head: Normocephalic and atraumatic. Eyes:      General:         Right eye: No discharge. Left eye: No discharge. Conjunctiva/sclera: Conjunctivae normal.      Pupils: Pupils are equal, round, and reactive to light. Neck:      Trachea: No tracheal deviation. Cardiovascular:      Rate and Rhythm: Normal rate and regular rhythm. Heart sounds: Normal heart sounds. No murmur heard. Pulmonary:      Effort: Pulmonary effort is normal. No respiratory distress. Breath sounds: Normal breath sounds. No wheezing or rales. Abdominal:      General: Bowel sounds are normal.      Palpations: Abdomen is soft. Tenderness: There is no abdominal tenderness. There is no guarding or rebound. Genitourinary:     Comments: Pelvic exam performed with nurse Jen amaro at bedside.   There is some blood in the vaginal vault.  There may be a polyp at the vaginal cuff. No ongoing active bleeding seen at this time  Musculoskeletal:         General: No tenderness or deformity. Normal range of motion. Cervical back: Normal range of motion and neck supple. Skin:     General: Skin is warm and dry. Findings: No erythema or rash. Neurological:      Mental Status: She is alert and oriented to person, place, and time. Psychiatric:         Behavior: Behavior normal.         Diagnostic Study Results     Labs -     No results found for this or any previous visit (from the past 12 hour(s)). Radiologic Studies -   CT ABD PELV W CONT   Final Result      1. No evidence of acute process in the abdomen or pelvis. CT Results  (Last 48 hours)               06/20/21 0931  CT ABD PELV W CONT Final result    Impression:      1. No evidence of acute process in the abdomen or pelvis. Narrative:  EXAM:  CT ABD PELV W CONT       INDICATION: blunt flank injury, hematuria. COMPARISON: Blunt flank injury, hematuria. CONTRAST:  100 mL of Isovue-370. TECHNIQUE:    Following the uneventful intravenous administration of contrast, thin axial   images were obtained through the abdomen and pelvis. Coronal and sagittal   reconstructions were generated. Oral contrast was not administered. CT dose   reduction was achieved through use of a standardized protocol tailored for this   examination and automatic exposure control for dose modulation. FINDINGS:    Lower Thorax:   Lung Bases: Stable mild subpleural scarring in the right lower lobe. No pleural   effusion. Heart: The heart is normal in size. No pericardial effusion. Abdomen/Pelvis:   Liver:  No focal liver lesions. Biliary system: Gallbladder is surgically absent. No intrahepatic or   extrahepatic biliary ductal dilatation. Spleen: Calcified granulomas in the spleen.        Pancreas: Normal.       Kidneys/Ureters/Bladder: Stable small simple and hemorrhagic cysts in the lower   pole of the left kidney. No renal or ureteral calculi. No hydronephrosis or   hydroureter. The bladder is normal.       Adrenals: Normal.       Stomach/bowel: No dilation or abnormal wall thickening is present. No free   intraperitoneal air noted. Mild sigmoid diverticulosis. Normal appendix. Reproductive Organs: Status post hysterectomy. No suspicious adnexal masses. Vasculature: Normal caliber arteries. Unchanged severe calcific atherosclerosis   of the abdominal aorta. Portal vein, SMV, and splenic vein are patent. Nodes: No pathologically enlarged lymph nodes. Fluid: No free fluid. Bones/Soft Tissue: No acute fractures or aggressive osseous lesions are seen. Postsurgical changes of L4-L5 posterior spinal fusion and decompression. CXR Results  (Last 48 hours)    None            Medical Decision Making   I am the first provider for this patient. I reviewed the vital signs, available nursing notes, past medical history, past surgical history, family history and social history. Vital Signs-Reviewed the patient's vital signs. No data found. Records Reviewed:   Nursing notes, Prior visits     Provider Notes (Medical Decision Making):   Patient evaluated found to be in no acute cardiopulmonary distress, could have blunt injury to the kidney leading to hematuria. Could also just be hemorrhagic cystitis. Patient is status post hysterectomy so that narrows the diagnosis significantly. We will do a pelvic exam to make sure there is no evidence of localized pelvic trauma as patient did have recent intercourse prior to onset of bleeding. ED Course:   Initial assessment performed. The patients presenting problems have been discussed, and they are in agreement with the care plan formulated and outlined with them. I have encouraged them to ask questions as they arise throughout their visit. Critical Care Time:   none    Disposition:    DISCHARGE NOTE  Patients results have been reviewed with them. Patient and/or family have verbally conveyed their understanding and agreement of the patient's signs, symptoms, diagnosis, treatment and prognosis and additionally agree to follow up as recommended or return to the Emergency Room should their condition change or have any new concerns prior to their follow-up appointment. Patient verbally agrees with the care-plan and verbally conveys that all of their questions have been answered. Discharge instructions have also been provided to the patient with some educational information regarding their diagnosis as well a list of reasons why they would want to return to the ER prior to their follow-up appointment should their condition change. PLAN:  1. Discharge Medication List as of 6/20/2021 10:47 AM        2. Follow-up Information     Follow up With Specialties Details Why Contact Info    your ob/gyn   regarding your vaginal bleeding     [facility] EMERGENCY DEPT Emergency Medicine  If symptoms worsen [address]  [address] [facility]  [phone]          Return to ED if worse     Diagnosis     Clinical Impression:   1. Vaginal bleeding        Attestations:   This note was completed by Brianna Grant DO

## 2021-11-26 ENCOUNTER — TRANSCRIBE ORDER (OUTPATIENT)
Dept: SCHEDULING | Age: 66
End: 2021-11-26

## 2021-11-26 DIAGNOSIS — Z12.31 VISIT FOR SCREENING MAMMOGRAM: Primary | ICD-10-CM

## 2022-01-17 ENCOUNTER — HOSPITAL ENCOUNTER (OUTPATIENT)
Dept: MAMMOGRAPHY | Age: 67
Discharge: HOME | End: 2022-01-17
Attending: NURSE PRACTITIONER
Payer: MEDICARE

## 2022-01-17 DIAGNOSIS — Z12.31 VISIT FOR SCREENING MAMMOGRAM: ICD-10-CM

## 2022-01-17 PROCEDURE — 77067 SCR MAMMO BI INCL CAD: CPT

## 2022-10-25 ENCOUNTER — HOSPITAL ENCOUNTER (OUTPATIENT)
Age: 67
Setting detail: OUTPATIENT SURGERY
Discharge: HOME | End: 2022-10-25
Attending: SPECIALIST | Admitting: SPECIALIST
Payer: MEDICARE

## 2022-10-25 ENCOUNTER — ANESTHESIA (OUTPATIENT)
Dept: ENDOSCOPY | Age: 67
End: 2022-10-25
Payer: MEDICARE

## 2022-10-25 ENCOUNTER — ANESTHESIA EVENT (OUTPATIENT)
Dept: ENDOSCOPY | Age: 67
End: 2022-10-25
Payer: MEDICARE

## 2022-10-25 VITALS
TEMPERATURE: 98.9 F | HEART RATE: 87 BPM | RESPIRATION RATE: 18 BRPM | BODY MASS INDEX: 31.23 KG/M2 | HEIGHT: 67 IN | DIASTOLIC BLOOD PRESSURE: 67 MMHG | SYSTOLIC BLOOD PRESSURE: 138 MMHG | OXYGEN SATURATION: 91 % | WEIGHT: 199 LBS

## 2022-10-25 PROCEDURE — 76040000007: Performed by: SPECIALIST

## 2022-10-25 PROCEDURE — 77030013992 HC SNR POLYP ENDOSC BSC -B: Performed by: SPECIALIST

## 2022-10-25 PROCEDURE — 74011000250 HC RX REV CODE- 250: Performed by: ANESTHESIOLOGY

## 2022-10-25 PROCEDURE — 74011250636 HC RX REV CODE- 250/636: Performed by: ANESTHESIOLOGY

## 2022-10-25 PROCEDURE — 88305 TISSUE EXAM BY PATHOLOGIST: CPT

## 2022-10-25 PROCEDURE — 77030021593 HC FCPS BIOP ENDOSC BSC -A: Performed by: SPECIALIST

## 2022-10-25 PROCEDURE — 76060000032 HC ANESTHESIA 0.5 TO 1 HR: Performed by: SPECIALIST

## 2022-10-25 PROCEDURE — 2709999900 HC NON-CHARGEABLE SUPPLY: Performed by: SPECIALIST

## 2022-10-25 RX ORDER — PROPOFOL 10 MG/ML
INJECTION, EMULSION INTRAVENOUS AS NEEDED
Status: DISCONTINUED | OUTPATIENT
Start: 2022-10-25 | End: 2022-10-25 | Stop reason: HOSPADM

## 2022-10-25 RX ORDER — GLYCOPYRROLATE 0.2 MG/ML
INJECTION INTRAMUSCULAR; INTRAVENOUS AS NEEDED
Status: DISCONTINUED | OUTPATIENT
Start: 2022-10-25 | End: 2022-10-25 | Stop reason: HOSPADM

## 2022-10-25 RX ORDER — EPINEPHRINE 0.1 MG/ML
1 INJECTION INTRACARDIAC; INTRAVENOUS
Status: DISCONTINUED | OUTPATIENT
Start: 2022-10-25 | End: 2022-10-25 | Stop reason: HOSPADM

## 2022-10-25 RX ORDER — SODIUM CHLORIDE 0.9 % (FLUSH) 0.9 %
5-40 SYRINGE (ML) INJECTION EVERY 8 HOURS
Status: DISCONTINUED | OUTPATIENT
Start: 2022-10-25 | End: 2022-10-25 | Stop reason: HOSPADM

## 2022-10-25 RX ORDER — SODIUM CHLORIDE 9 MG/ML
50 INJECTION, SOLUTION INTRAVENOUS CONTINUOUS
Status: DISCONTINUED | OUTPATIENT
Start: 2022-10-25 | End: 2022-10-25 | Stop reason: HOSPADM

## 2022-10-25 RX ORDER — SODIUM CHLORIDE 0.9 % (FLUSH) 0.9 %
5-40 SYRINGE (ML) INJECTION AS NEEDED
Status: DISCONTINUED | OUTPATIENT
Start: 2022-10-25 | End: 2022-10-25 | Stop reason: HOSPADM

## 2022-10-25 RX ORDER — ATROPINE SULFATE 0.1 MG/ML
0.5 INJECTION INTRAVENOUS
Status: DISCONTINUED | OUTPATIENT
Start: 2022-10-25 | End: 2022-10-25 | Stop reason: HOSPADM

## 2022-10-25 RX ORDER — DEXTROMETHORPHAN/PSEUDOEPHED 2.5-7.5/.8
1.2 DROPS ORAL
Status: DISCONTINUED | OUTPATIENT
Start: 2022-10-25 | End: 2022-10-25 | Stop reason: HOSPADM

## 2022-10-25 RX ORDER — SODIUM CHLORIDE 9 MG/ML
INJECTION, SOLUTION INTRAVENOUS
Status: DISCONTINUED | OUTPATIENT
Start: 2022-10-25 | End: 2022-10-25 | Stop reason: HOSPADM

## 2022-10-25 RX ORDER — LIDOCAINE HYDROCHLORIDE 20 MG/ML
INJECTION, SOLUTION EPIDURAL; INFILTRATION; INTRACAUDAL; PERINEURAL AS NEEDED
Status: DISCONTINUED | OUTPATIENT
Start: 2022-10-25 | End: 2022-10-25 | Stop reason: HOSPADM

## 2022-10-25 RX ORDER — FLUMAZENIL 0.1 MG/ML
0.2 INJECTION INTRAVENOUS
Status: DISCONTINUED | OUTPATIENT
Start: 2022-10-25 | End: 2022-10-25 | Stop reason: HOSPADM

## 2022-10-25 RX ORDER — NALOXONE HYDROCHLORIDE 0.4 MG/ML
0.4 INJECTION, SOLUTION INTRAMUSCULAR; INTRAVENOUS; SUBCUTANEOUS
Status: DISCONTINUED | OUTPATIENT
Start: 2022-10-25 | End: 2022-10-25 | Stop reason: HOSPADM

## 2022-10-25 RX ADMIN — PROPOFOL 25 MG: 10 INJECTION, EMULSION INTRAVENOUS at 08:57

## 2022-10-25 RX ADMIN — PROPOFOL 25 MG: 10 INJECTION, EMULSION INTRAVENOUS at 08:44

## 2022-10-25 RX ADMIN — PROPOFOL 50 MG: 10 INJECTION, EMULSION INTRAVENOUS at 08:36

## 2022-10-25 RX ADMIN — PROPOFOL 50 MG: 10 INJECTION, EMULSION INTRAVENOUS at 08:38

## 2022-10-25 RX ADMIN — PROPOFOL 25 MG: 10 INJECTION, EMULSION INTRAVENOUS at 08:50

## 2022-10-25 RX ADMIN — PROPOFOL 25 MG: 10 INJECTION, EMULSION INTRAVENOUS at 09:03

## 2022-10-25 RX ADMIN — GLYCOPYRROLATE 0.2 MG: 0.2 INJECTION INTRAMUSCULAR; INTRAVENOUS at 08:36

## 2022-10-25 RX ADMIN — PROPOFOL 25 MG: 10 INJECTION, EMULSION INTRAVENOUS at 09:00

## 2022-10-25 RX ADMIN — PROPOFOL 25 MG: 10 INJECTION, EMULSION INTRAVENOUS at 08:52

## 2022-10-25 RX ADMIN — PROPOFOL 25 MG: 10 INJECTION, EMULSION INTRAVENOUS at 08:48

## 2022-10-25 RX ADMIN — PROPOFOL 25 MG: 10 INJECTION, EMULSION INTRAVENOUS at 08:54

## 2022-10-25 RX ADMIN — LIDOCAINE HYDROCHLORIDE 50 MG: 20 INJECTION, SOLUTION EPIDURAL; INFILTRATION; INTRACAUDAL; PERINEURAL at 08:36

## 2022-10-25 RX ADMIN — PROPOFOL 25 MG: 10 INJECTION, EMULSION INTRAVENOUS at 08:46

## 2022-10-25 RX ADMIN — SODIUM CHLORIDE: 9 INJECTION, SOLUTION INTRAVENOUS at 08:33

## 2022-10-25 RX ADMIN — PROPOFOL 25 MG: 10 INJECTION, EMULSION INTRAVENOUS at 08:41

## 2022-10-25 NOTE — PROCEDURES
[address]  [address]                 Colonoscopy Procedure Note    Indications:   See Preoperative Diagnosis above  Referring Physician: Leif Domingo NP  Anesthesia/Sedation: MAC anesthesia Propofol  Endoscopist:  Dr. Amberly Nava  Assistant:  Endoscopy Technician-1: Kenroy Arce  Endoscopy RN-1: Kandis Antonio RN  Preoperative diagnosis: DYSPHAGIA, ESSENTIAL PRIMARY HYPERTENSION, GASTROESOPHAGEAL REFLUX DISEASE, PERSONAL HX OF COLONIC POLYPS  Postoperative diagnosis: Diverticulosis  Colon Polyps    Procedure in Detail:  Informed consent was obtained for the procedure, including sedation. Risks of perforation, hemorrhage, adverse drug reaction, and aspiration were discussed. The patient was placed in the left lateral decubitus position. Based on the pre-procedure assessment, including review of the patient's medical history, medications, allergies, and review of systems, she had been deemed to be an appropriate candidate for  sedation; she was therefore sedated with the medications listed above. The patient was monitored continuously with ECG tracing, pulse oximetry, blood pressure monitoring, and direct observations. A rectal examination was performed. The [id number] was inserted into the rectum and advanced under direct vision to the cecum, which was identified by the ileocecal valve and appendiceal orifice. The quality of the colonic preparation was fair. A careful inspection was made as the colonoscope was withdrawn, including a retroflexed view of the rectum; findings and interventions are described below. Appropriate photodocumentation was obtained. Findings:  Rectum: normal  Sigmoid: mild diverticulosis; Descending Colon: mild diverticulosis;  Transverse Colon: mild diverticulosis;   Ascending Colon: Two sessile polyps measuring 4, 8 mm were removed with hot snare  Cecum: normal    Specimens:     Colon polyps    EBL: None    Complications: None; patient tolerated the procedure well. Recommendations:     - Await pathology. - Repeat colonoscopy in 5 years. - High fiber diet.         Signed By: Lizzeth Cohn MD                        October 25, 2022

## 2022-10-25 NOTE — PROCEDURES
[address]                 NAME:  Marlys Pham   :      MRN:   [id number]     Date/Time:  10/25/2022 9:12 AM    Esophagogastroduodenoscopy (EGD) Procedure Note    :  Larry Hoang MD    Staff: Endoscopy Technician-1: Silvana Jason  Endoscopy RN-1: Urszula Correa RN     Referring Provider:  Myranda Muir NP    Anethesia/Sedation:  MAC anesthesia Propofol    Preoperative diagnosis: DYSPHAGIA, ESSENTIAL PRIMARY HYPERTENSION, GASTROESOPHAGEAL REFLUX DISEASE, PERSONAL HX OF COLONIC POLYPS    Postoperative diagnosis: Diverticulosis  Colon Polyps    Procedure Details     After infom consent was obtained for the procedure, with all risks and benefits of procedure explained the patient was taken to the endoscopy suite and placed in the left lateral decubitus position. Following sequential administration of sedation as per above, the [id number] gastroscope was inserted into the mouth and advanced under direct vision to second portion of the duodenum. A careful inspection was made as the gastroscope was withdrawn, including a retroflexed view of the proximal stomach; findings and interventions are described below. Findings:  Esophagus:Irregular Z line at 40 cm, biopsies done to rule out Fleming esophagus, random biopsies done from remaining esophagus, esophagus dilated with 46 F wire guided Savary dilator  Stomach:normal   Duodenum/jejunum: 12 mm lipoma in descending duodenum biopsied      Therapies:  as above    Specimens: duodenal lipoma, GE junction bx, random esophagus bx           EBL: None    Complications:   None; patient tolerated the procedure well. Impression:    See Postoperative diagnosis above    Recommendations:  -Await pathology. , -Continue current meds    Discharge disposition:  Home in the company of  when able to ambulate    Larry Hoang MD

## 2022-10-25 NOTE — DISCHARGE INSTRUCTIONS
Romayne Bladensburg  [id number]  1955    COLON/EGD DISCHARGE INSTRUCTIONS  Discomfort:  Redness at IV site- apply warm compress to area; if redness or soreness persist- contact your physician  There may be a slight amount of blood passed from the rectum  Gaseous discomfort- walking, belching will help relieve any discomfort  Sore throat- throat lozenges or warm salt water gargle  You may not operate a vehicle for 12 hours  You may not engage in an occupation involving machinery or appliances for rest of today  You may not drink alcoholic beverages for at least 12 hours  Avoid making any critical decisions for at least 24 hour  DIET:   High fiber diet. - however -  remember your colon is empty and a heavy meal will produce gas. Avoid these foods:  vegetables, fried / greasy foods, carbonated drinks for today. MEDICATIONS:      Regarding Aspirin or Nonsteroidal medications, please see below. ACTIVITY:  You may resume your normal daily activities it is recommended that you spend the remainder of the day resting -  avoid any strenuous activity. CALL M.D. ANY SIGN OF:  Increasing pain, nausea, vomiting  Abdominal distension (swelling)  New increased bleeding (oral or rectal)  Fever (chills)  Pain in chest area  Bloody discharge from nose or mouth  Shortness of breath    You may not  take any Advil, Aspirin, Ibuprofen, Motrin, Aleve, or Goodys for 10 days, ONLY  Tylenol as needed for pain.     Post procedure diagnosis: Diverticulosis  Colon Polyps      Follow-up Instructions:   Call Dr. Chiquita Newsome  Results of procedure / biopsy in 10 days  Telephone #  [phone]      DISCHARGE SUMMARY from Nurse    The following personal items collected during your admission are returned to you:   Dental Appliance: Dental Appliances: None  Vision: Visual Aid: None  Hearing Aid:    Jewelry:    Clothing:    Other Valuables:    Valuables sent to safe:

## 2022-10-25 NOTE — ANESTHESIA PREPROCEDURE EVALUATION
Relevant Problems   No relevant active problems       Anesthetic History   No history of anesthetic complications            Review of Systems / Medical History  Patient summary reviewed, nursing notes reviewed and pertinent labs reviewed    Pulmonary  Within defined limits                 Neuro/Psych   Within defined limits      Psychiatric history     Cardiovascular  Within defined limits  Hypertension                   GI/Hepatic/Renal  Within defined limits   GERD           Endo/Other  Within defined limits      Arthritis     Other Findings              Physical Exam    Airway  Mallampati: II  TM Distance: > 6 cm  Neck ROM: normal range of motion   Mouth opening: Normal     Cardiovascular  Regular rate and rhythm,  S1 and S2 normal,  no murmur, click, rub, or gallop             Dental  No notable dental hx       Pulmonary  Breath sounds clear to auscultation               Abdominal  GI exam deferred       Other Findings            Anesthetic Plan    ASA: 2  Anesthesia type: MAC            Anesthetic plan and risks discussed with: Patient

## 2022-10-25 NOTE — ANESTHESIA POSTPROCEDURE EVALUATION
Procedure(s):  ESOPHAGOGASTRODUODENOSCOPY (EGD)  COLONOSCOPY  ESOPHAGEAL DILATION  ESOPHAGOGASTRODUODENAL (EGD) BIOPSY  ENDOSCOPIC POLYPECTOMY. MAC    Anesthesia Post Evaluation      Multimodal analgesia: multimodal analgesia not used between 6 hours prior to anesthesia start to PACU discharge  Patient location during evaluation: PACU  Patient participation: complete - patient participated  Level of consciousness: awake  Pain score: 0  Pain management: adequate  Airway patency: patent  Anesthetic complications: no  Cardiovascular status: acceptable  Respiratory status: acceptable  Hydration status: acceptable  Comments: I have evaluated the patient and meets criteria for discharge from PACU. Althea Cabot., MD    Final Post Anesthesia Temperature Assessment:  Normothermia (36.0-37.5 degrees C)      INITIAL Post-op Vital signs:   Vitals Value Taken Time   /69 10/25/22 0915   Temp 37.2 °C (98.9 °F) 10/25/22 0915   Pulse 81 10/25/22 0919   Resp 21 10/25/22 0919   SpO2 94 % 10/25/22 0919   Vitals shown include unvalidated device data.

## 2022-10-25 NOTE — H&P
Colonoscopy History and Physical      The patient was seen and examined. Date of last colonoscopy: 2017, Polyps  Yes      The airway was assessed and documented. The problem list, past medical history, and medications were reviewed. Patient Active Problem List   Diagnosis Code    Spinal stenosis M48.00     Social History     Socioeconomic History    Marital status:      Spouse name: Not on file    Number of children: Not on file    Years of education: Not on file    Highest education level: Not on file   Occupational History    Not on file   Tobacco Use    Smoking status: Every Day     Packs/day: 0.50     Types: Cigarettes    Smokeless tobacco: Never   Substance and Sexual Activity    Alcohol use: Yes     Comment: rare    Drug use: No    Sexual activity: Not on file   Other Topics Concern    Not on file   Social History Narrative    Not on file     Social Determinants of Health     Financial Resource Strain: Not on file   Food Insecurity: Not on file   Transportation Needs: Not on file   Physical Activity: Not on file   Stress: Not on file   Social Connections: Not on file   Intimate Partner Violence: Not on file   Housing Stability: Not on file     Past Medical History:   Diagnosis Date    Adverse effect of anesthesia     passed out when \"she woke me up\"    Anxiety     Arthritis     Calculus of kidney     Chronic pain     arthritis/bursitis both hips/back    Depression     GERD (gastroesophageal reflux disease)     Hypertension     Other ill-defined conditions(799.89)     kidney stones         Prior to Admission Medications   Prescriptions Last Dose Informant Patient Reported? Taking?   acetaminophen (TYLENOL) 500 mg tablet Unknown  Yes No   Sig: Take 500 mg by mouth three (3) times daily. cholecalciferol (VITAMIN D3) 25 mcg (1,000 unit) cap 9/25/2022  Yes Yes   Sig: Take 2,000 Units by mouth daily. cyanocobalamin 1,000 mcg tablet 10/24/2022  Yes Yes   Sig: Take 1,000 mcg by mouth daily. escitalopram oxalate (LEXAPRO) 10 mg tablet 10/24/2022  Yes Yes   Sig: Take 10 mg by mouth daily. gabapentin (NEURONTIN) 300 mg capsule 10/24/2022  Yes Yes   Sig: Take 300 mg by mouth daily. lisinopril (PRINIVIL, ZESTRIL) 5 mg tablet 10/24/2022  Yes Yes   Sig: Take 5 mg by mouth nightly. omeprazole (PRILOSEC) 20 mg capsule 10/24/2022  Yes Yes   Sig: Take 40 mg by mouth daily. ondansetron hcl (ZOFRAN, AS HYDROCHLORIDE,) 4 mg tablet Not Taking  No No   Sig: Take 1 Tab by mouth every eight (8) hours as needed for Nausea. Patient not taking: Reported on 10/25/2022   traMADol (ULTRAM) 50 mg tablet Not Taking  No No   Sig: Take 1 Tab by mouth every six (6) hours as needed for Pain. Max Daily Amount: 200 mg. Patient not taking: Reported on 10/25/2022      Facility-Administered Medications: None       The patient was seen and examined in the endoscopy suite. The airway was assessed and documented. The problem list and medications were reviewed. Chief complaint, history of present illness, and review of systems and Past medical History are positive for: dysphagia, GERD and colon polyps    The heart, lungs and mental status were satisfactory for the administration of sedation and for the procedure. I discussed with the patient the objectives, risks, consequences and alternatives to the procedure. The patient was counseled at length about the risks of juan Covid-19 in the hannah-operative and post-operative states including the recovery window of their procedure. The patient was made aware that juan Covid-19 after a surgical procedure may worsen their prognosis for recovering from the virus and lend to a higher morbidity and or mortality risk. The patient was given the options of postponing their procedure. All of the risks, benefits, and alternatives were discussed. The patient does  wish to proceed with the procedure.     Plan: Endoscopic procedure with sedation     Radha Sutton MD 10/25/2022  8:33 AM

## 2022-10-25 NOTE — PROGRESS NOTES
Verbal report received from: Charanjit Austin RN  Procedure: Procedure(s):  ESOPHAGOGASTRODUODENOSCOPY (EGD)  COLONOSCOPY  ESOPHAGEAL DILATION  ESOPHAGOGASTRODUODENAL (EGD) BIOPSY  ENDOSCOPIC POLYPECTOMY    Vital signs stable. Abdominal assessment: round and soft. Post operative education complete. Discharge criteria met and pt discharged per MD order. PIV removed prior to discharge. Pt transported by staff via w/c to family/friend.

## 2022-10-25 NOTE — PERIOP NOTES
Initial RN admission and assessment performed and documented in Endoscopy navigator. Patient evaluated by anesthesia in pre-procedure holding. All procedural vital signs, airway assessment, and level of consciousness information monitored and recorded by anesthesia staff on the anesthesia record. Patient specimens obtained, Reviewed by MD.      Report received from CRNA post procedure. Patient transported to recovery area by RN. Endoscope was pre-cleaned at bedside immediately following procedure by Basilia Vivas.

## 2022-11-28 ENCOUNTER — TRANSCRIBE ORDER (OUTPATIENT)
Dept: SCHEDULING | Age: 67
End: 2022-11-28

## 2022-11-28 DIAGNOSIS — Z12.31 SCREENING MAMMOGRAM FOR HIGH-RISK PATIENT: Primary | ICD-10-CM

## 2023-01-18 ENCOUNTER — HOSPITAL ENCOUNTER (OUTPATIENT)
Dept: MAMMOGRAPHY | Age: 68
Discharge: HOME | End: 2023-01-18
Attending: NURSE PRACTITIONER
Payer: MEDICARE

## 2023-01-18 DIAGNOSIS — Z12.31 SCREENING MAMMOGRAM FOR HIGH-RISK PATIENT: ICD-10-CM

## 2023-01-18 PROCEDURE — 77067 SCR MAMMO BI INCL CAD: CPT

## 2023-06-01 ENCOUNTER — TRANSCRIBE ORDERS (OUTPATIENT)
Facility: HOSPITAL | Age: 68
End: 2023-06-01

## 2023-06-01 DIAGNOSIS — M81.0 SENILE OSTEOPOROSIS: Primary | ICD-10-CM

## 2023-08-04 ENCOUNTER — HOSPITAL ENCOUNTER (OUTPATIENT)
Facility: HOSPITAL | Age: 68
Discharge: HOME | End: 2023-08-04
Payer: COMMERCIAL

## 2023-08-04 DIAGNOSIS — M81.0 SENILE OSTEOPOROSIS: ICD-10-CM

## 2023-08-04 PROCEDURE — 77080 DXA BONE DENSITY AXIAL: CPT

## 2023-12-28 ENCOUNTER — TRANSCRIBE ORDERS (OUTPATIENT)
Facility: HOSPITAL | Age: 68
End: 2023-12-28

## 2023-12-28 DIAGNOSIS — Z12.31 VISIT FOR SCREENING MAMMOGRAM: Primary | ICD-10-CM

## 2025-01-23 ENCOUNTER — TRANSCRIBE ORDERS (OUTPATIENT)
Facility: HOSPITAL | Age: 70
End: 2025-01-23

## 2025-01-23 DIAGNOSIS — Z78.0 ASYMPTOMATIC MENOPAUSAL STATE: ICD-10-CM

## 2025-01-23 DIAGNOSIS — Z12.31 ENCOUNTER FOR SCREENING MAMMOGRAM FOR MALIGNANT NEOPLASM OF BREAST: Primary | ICD-10-CM

## 2025-02-25 ENCOUNTER — HOSPITAL ENCOUNTER (OUTPATIENT)
Facility: HOSPITAL | Age: 70
Discharge: HOME | End: 2025-02-28
Payer: MEDICARE

## 2025-02-25 VITALS — BODY MASS INDEX: 31.23 KG/M2 | WEIGHT: 199 LBS | HEIGHT: 67 IN

## 2025-02-25 DIAGNOSIS — Z12.31 ENCOUNTER FOR SCREENING MAMMOGRAM FOR MALIGNANT NEOPLASM OF BREAST: ICD-10-CM

## 2025-02-25 PROCEDURE — 77067 SCR MAMMO BI INCL CAD: CPT

## 2025-04-10 ENCOUNTER — TRANSCRIBE ORDERS (OUTPATIENT)
Facility: HOSPITAL | Age: 70
End: 2025-04-10

## 2025-04-10 DIAGNOSIS — N28.1 RENAL CYST, LEFT: Primary | ICD-10-CM

## 2025-04-11 ENCOUNTER — TRANSCRIBE ORDERS (OUTPATIENT)
Facility: HOSPITAL | Age: 70
End: 2025-04-11

## 2025-04-11 DIAGNOSIS — E04.9 ENLARGEMENT OF THYROID: Primary | ICD-10-CM

## 2025-04-28 ENCOUNTER — HOSPITAL ENCOUNTER (OUTPATIENT)
Facility: HOSPITAL | Age: 70
Discharge: HOME | End: 2025-05-01
Payer: MEDICARE

## 2025-04-28 DIAGNOSIS — E04.9 ENLARGEMENT OF THYROID: ICD-10-CM

## 2025-04-28 DIAGNOSIS — N28.1 RENAL CYST, LEFT: ICD-10-CM

## 2025-04-28 PROCEDURE — 76770 US EXAM ABDO BACK WALL COMP: CPT

## 2025-04-28 PROCEDURE — 76536 US EXAM OF HEAD AND NECK: CPT

## 2025-08-11 ENCOUNTER — HOSPITAL ENCOUNTER (OUTPATIENT)
Facility: HOSPITAL | Age: 70
Discharge: HOME | End: 2025-08-14
Payer: MEDICARE

## 2025-08-11 DIAGNOSIS — Z78.0 ASYMPTOMATIC MENOPAUSAL STATE: ICD-10-CM

## 2025-08-11 PROCEDURE — 77080 DXA BONE DENSITY AXIAL: CPT

## (undated) DEVICE — CANN NASAL O2 CAPNOGRAPHY AD -- FILTERLINE

## (undated) DEVICE — KENDALL RADIOLUCENT FOAM MONITORING ELECTRODE -RECTANGULAR SHAPE: Brand: KENDALL

## (undated) DEVICE — BAG SPEC BIOHZD LF 2MIL 6X10IN -- CONVERT TO ITEM 357326

## (undated) DEVICE — FCPS RAD JAW 4LC 240CM W/NDL -- BX/40

## (undated) DEVICE — REM POLYHESIVE ADULT PATIENT RETURN ELECTRODE: Brand: VALLEYLAB

## (undated) DEVICE — SNARE ENDOSCP M L240CM W27MM SHTH DIA2.4MM CHN 2.8MM OVL

## (undated) DEVICE — DILATOR ESOPH 51 FRX17 MM OVR THE GUIDEWIRE FLX SAFEGUIDE

## (undated) DEVICE — Device

## (undated) DEVICE — ENDO CARRY-ON PROCEDURE KIT INCLUDES ENZYMATIC SPONGE, GAUZE, BIOHAZARD LABEL, TRAY, LUBRICANT, DIRTY SCOPE LABEL, WATER LABEL, TRAY, DRAWSTRING PAD, AND DEFENDO 4-PIECE KIT.: Brand: ENDO CARRY-ON PROCEDURE KIT

## (undated) DEVICE — BAG BELONG PT PERS CLEAR HANDL

## (undated) DEVICE — TRAP SURG QUAD PARABOLA SLOT DSGN SFTY SCRN TRAPEASE

## (undated) DEVICE — 1200 GUARD II KIT W/5MM TUBE W/O VAC TUBE: Brand: GUARDIAN

## (undated) DEVICE — SOLIDIFIER FLUID 3000 CC ABSORB

## (undated) DEVICE — Z DISCONTINUED NO SUB IDED SET EXTN W/ 4 W STPCOCK M SPIN LOK 36IN

## (undated) DEVICE — FORCEPS BX L240CM JAW DIA2.8MM L CAP W/ NDL MIC MESH TOOTH

## (undated) DEVICE — AIRLIFE™ U/CONNECT-IT OXYGEN TUBING 7 FEET (2.1 M) CRUSH-RESISTANT OXYGEN TUBING, VINYL TIPPED: Brand: AIRLIFE™

## (undated) DEVICE — KIT IV STRT W CHLORAPREP PD 1ML

## (undated) DEVICE — Z DISCONTINUED USE 2751540 TUBING IRRIG L10IN DISP PMP ENDOGATOR

## (undated) DEVICE — NEONATAL-ADULT SPO2 SENSOR: Brand: NELLCOR

## (undated) DEVICE — BW-412T DISP COMBO CLEANING BRUSH: Brand: SINGLE USE COMBINATION CLEANING BRUSH

## (undated) DEVICE — CONNECTOR TBNG AUX H2O JET DISP FOR OLY 160/180 SER

## (undated) DEVICE — NEEDLE HYPO 18GA L1.5IN PNK S STL HUB POLYPR SHLD REG BVL

## (undated) DEVICE — SYRINGE MED 20ML STD CLR PLAS LUERLOCK TIP N CTRL DISP

## (undated) DEVICE — SET ADMIN 16ML TBNG L100IN 2 Y INJ SITE IV PIGGY BK DISP

## (undated) DEVICE — CATH IV AUTOGRD BC BLU 22GA 25 -- INSYTE

## (undated) DEVICE — CONTAINER SPEC 20 ML LID NEUT BUFF FORMALIN 10 % POLYPR STS

## (undated) DEVICE — BITE BLK ENDOSCP AD 54FR GRN POLYETH ENDOSCP W STRP SLD

## (undated) DEVICE — TUBING HYDR IRR --

## (undated) DEVICE — WRISTBAND ID AD W1XL11.5IN RED POLY ALRG PREPRINTED PERM

## (undated) DEVICE — QUILTED PREMIUM COMFORT UNDERPAD,EXTRA HEAVY: Brand: WINGS